# Patient Record
Sex: FEMALE | Race: BLACK OR AFRICAN AMERICAN | NOT HISPANIC OR LATINO | Employment: OTHER | URBAN - METROPOLITAN AREA
[De-identification: names, ages, dates, MRNs, and addresses within clinical notes are randomized per-mention and may not be internally consistent; named-entity substitution may affect disease eponyms.]

---

## 2024-02-12 ENCOUNTER — APPOINTMENT (OUTPATIENT)
Dept: OTOLARYNGOLOGY | Facility: CLINIC | Age: 69
End: 2024-02-12
Payer: MEDICARE

## 2024-05-03 ENCOUNTER — HOSPITAL ENCOUNTER (EMERGENCY)
Facility: HOSPITAL | Age: 69
Discharge: HOME | End: 2024-05-03
Payer: MEDICARE

## 2024-05-03 ENCOUNTER — PHARMACY VISIT (OUTPATIENT)
Dept: PHARMACY | Facility: CLINIC | Age: 69
End: 2024-05-03

## 2024-05-03 VITALS
TEMPERATURE: 98.2 F | OXYGEN SATURATION: 98 % | HEIGHT: 66 IN | BODY MASS INDEX: 33.11 KG/M2 | DIASTOLIC BLOOD PRESSURE: 76 MMHG | SYSTOLIC BLOOD PRESSURE: 135 MMHG | WEIGHT: 206 LBS | RESPIRATION RATE: 20 BRPM | HEART RATE: 81 BPM

## 2024-05-03 DIAGNOSIS — M10.9 ACUTE GOUT INVOLVING TOE OF LEFT FOOT, UNSPECIFIED CAUSE: Primary | ICD-10-CM

## 2024-05-03 PROCEDURE — 99283 EMERGENCY DEPT VISIT LOW MDM: CPT

## 2024-05-03 PROCEDURE — RXMED WILLOW AMBULATORY MEDICATION CHARGE

## 2024-05-03 RX ORDER — INDOMETHACIN 25 MG/1
50 CAPSULE ORAL
Qty: 42 CAPSULE | Refills: 0 | Status: SHIPPED | OUTPATIENT
Start: 2024-05-03

## 2024-05-03 ASSESSMENT — LIFESTYLE VARIABLES
EVER FELT BAD OR GUILTY ABOUT YOUR DRINKING: NO
HAVE YOU EVER FELT YOU SHOULD CUT DOWN ON YOUR DRINKING: NO
EVER HAD A DRINK FIRST THING IN THE MORNING TO STEADY YOUR NERVES TO GET RID OF A HANGOVER: NO
TOTAL SCORE: 0
HAVE PEOPLE ANNOYED YOU BY CRITICIZING YOUR DRINKING: NO

## 2024-05-03 ASSESSMENT — COLUMBIA-SUICIDE SEVERITY RATING SCALE - C-SSRS
2. HAVE YOU ACTUALLY HAD ANY THOUGHTS OF KILLING YOURSELF?: NO
6. HAVE YOU EVER DONE ANYTHING, STARTED TO DO ANYTHING, OR PREPARED TO DO ANYTHING TO END YOUR LIFE?: NO
1. IN THE PAST MONTH, HAVE YOU WISHED YOU WERE DEAD OR WISHED YOU COULD GO TO SLEEP AND NOT WAKE UP?: NO

## 2024-05-03 ASSESSMENT — PAIN DESCRIPTION - ORIENTATION: ORIENTATION: LEFT

## 2024-05-03 ASSESSMENT — PAIN DESCRIPTION - PAIN TYPE: TYPE: ACUTE PAIN

## 2024-05-03 ASSESSMENT — PAIN DESCRIPTION - DESCRIPTORS: DESCRIPTORS: ACHING

## 2024-05-03 ASSESSMENT — PAIN - FUNCTIONAL ASSESSMENT: PAIN_FUNCTIONAL_ASSESSMENT: 0-10

## 2024-05-03 ASSESSMENT — PAIN DESCRIPTION - LOCATION: LOCATION: TOE (COMMENT WHICH ONE)

## 2024-05-03 ASSESSMENT — PAIN SCALES - GENERAL: PAINLEVEL_OUTOF10: 10 - WORST POSSIBLE PAIN

## 2024-05-03 NOTE — ED PROVIDER NOTES
Chief Complaint   Patient presents with   • Toe Pain     C/O left great toe pain       HPI       69 year old female presents to the Emergency Department today complaining of awakening this am with left great toe pain. Denies any injury/trauma to the area. Denies any associated fever, chills, headache, neck pain, chest pain, shortness of breath, abdominal pain, nausea, vomiting, diarrhea, constipation, or urinary symptoms.       History provided by:  Patient             Patient History   No past medical history on file.  No past surgical history on file.  No family history on file.  Social History     Tobacco Use   • Smoking status: Not on file   • Smokeless tobacco: Not on file   Substance Use Topics   • Alcohol use: Not on file   • Drug use: Not on file           Physical Exam  Constitutional:       General: She is awake.      Appearance: Normal appearance.   Cardiovascular:      Rate and Rhythm: Normal rate and regular rhythm.      Pulses:           Radial pulses are 3+ on the right side and 3+ on the left side.      Heart sounds: Normal heart sounds. No murmur heard.     No friction rub. No gallop.   Pulmonary:      Effort: Pulmonary effort is normal.      Breath sounds: Normal breath sounds and air entry.   Musculoskeletal:      Comments: Redness, edema, and tenderness is noted to the left 1st MTP joint consistent with gout. Full ROM. Left dorsalis pedis pulse is strong and regular. Capillary refill was within normal limits. Sensation is intact distally.    Neurological:      Mental Status: She is alert.   Psychiatric:         Behavior: Behavior is cooperative.         Labs Reviewed - No data to display    No orders to display            ED Course & MDM   Diagnoses as of 05/03/24 1543   Acute gout involving toe of left foot, unspecified cause           Medical Decision Making  Patient was seen and evaluated by myself. Without any injury or trauma to the area, I do not feel that a x-ray is clinically indicated.  There is no pain with ROM and I do not feel that she is exhibiting signs of a septic joint. Rather, I feel that her symptoms are secondary to gout. Given a prescription for Indomethacin. No contraindications to NSAIDs are noted. Follow up with their doctor in 3 days. Return if worse in any way. Discharged in stable condition with computer instructions.    Diagnostic Impression:     1. Acute gout    2. Prescription therapy            Your medication list      You have not been prescribed any medications.           Procedure  Procedures     Narayan Noriega, KATY-CNP  05/03/24 1542

## 2025-01-21 ENCOUNTER — HOSPITAL ENCOUNTER (INPATIENT)
Facility: HOSPITAL | Age: 70
LOS: 1 days | Discharge: HOME | End: 2025-01-22
Attending: STUDENT IN AN ORGANIZED HEALTH CARE EDUCATION/TRAINING PROGRAM | Admitting: STUDENT IN AN ORGANIZED HEALTH CARE EDUCATION/TRAINING PROGRAM
Payer: MEDICARE

## 2025-01-21 ENCOUNTER — APPOINTMENT (OUTPATIENT)
Dept: CARDIOLOGY | Facility: HOSPITAL | Age: 70
End: 2025-01-21
Payer: MEDICARE

## 2025-01-21 ENCOUNTER — APPOINTMENT (OUTPATIENT)
Dept: RADIOLOGY | Facility: HOSPITAL | Age: 70
End: 2025-01-21
Payer: MEDICARE

## 2025-01-21 DIAGNOSIS — K52.9 GASTROENTERITIS: ICD-10-CM

## 2025-01-21 DIAGNOSIS — K56.609 SBO (SMALL BOWEL OBSTRUCTION) (MULTI): Primary | ICD-10-CM

## 2025-01-21 PROBLEM — H90.5 SENSORINEURAL HEARING LOSS (SNHL): Status: ACTIVE | Noted: 2025-01-21

## 2025-01-21 PROBLEM — K21.9 GASTROESOPHAGEAL REFLUX DISEASE: Status: ACTIVE | Noted: 2019-04-30

## 2025-01-21 PROBLEM — I10 ESSENTIAL (PRIMARY) HYPERTENSION: Status: ACTIVE | Noted: 2023-05-17

## 2025-01-21 PROBLEM — M10.9 GOUTY ARTHRITIS OF TOE: Status: ACTIVE | Noted: 2025-01-21

## 2025-01-21 PROBLEM — M54.12 CERVICAL RADICULOPATHY: Status: ACTIVE | Noted: 2021-02-17

## 2025-01-21 PROBLEM — J34.2 DEVIATED NASAL SEPTUM: Status: ACTIVE | Noted: 2025-01-21

## 2025-01-21 PROBLEM — E78.5 HYPERLIPIDEMIA: Status: ACTIVE | Noted: 2019-04-30

## 2025-01-21 PROBLEM — N25.81 SECONDARY HYPERPARATHYROIDISM (MULTI): Status: ACTIVE | Noted: 2019-06-14

## 2025-01-21 PROBLEM — J31.0 CHRONIC RHINITIS: Status: ACTIVE | Noted: 2025-01-21

## 2025-01-21 PROBLEM — E55.9 VITAMIN D DEFICIENCY: Status: ACTIVE | Noted: 2019-05-24

## 2025-01-21 PROBLEM — R42 DIZZINESS AND GIDDINESS: Status: ACTIVE | Noted: 2023-05-17

## 2025-01-21 PROBLEM — R10.9 ABDOMINAL PAIN: Status: ACTIVE | Noted: 2019-05-24

## 2025-01-21 PROBLEM — N28.9 RENAL INSUFFICIENCY: Status: ACTIVE | Noted: 2019-05-28

## 2025-01-21 PROBLEM — Z86.39 HISTORY OF THYROID DISORDER: Status: ACTIVE | Noted: 2025-01-21

## 2025-01-21 PROBLEM — Z86.39 HISTORY OF ELEVATED LIPIDS: Status: ACTIVE | Noted: 2025-01-21

## 2025-01-21 LAB
ALBUMIN SERPL BCP-MCNC: 4.5 G/DL (ref 3.4–5)
ALP SERPL-CCNC: 107 U/L (ref 33–136)
ALT SERPL W P-5'-P-CCNC: 21 U/L (ref 7–45)
ANION GAP SERPL CALC-SCNC: 12 MMOL/L (ref 10–20)
AST SERPL W P-5'-P-CCNC: 16 U/L (ref 9–39)
BASOPHILS # BLD AUTO: 0.03 X10*3/UL (ref 0–0.1)
BASOPHILS NFR BLD AUTO: 0.4 %
BILIRUB SERPL-MCNC: 0.5 MG/DL (ref 0–1.2)
BUN SERPL-MCNC: 21 MG/DL (ref 6–23)
CALCIUM SERPL-MCNC: 10.7 MG/DL (ref 8.6–10.3)
CARDIAC TROPONIN I PNL SERPL HS: 4 NG/L (ref 0–13)
CHLORIDE SERPL-SCNC: 102 MMOL/L (ref 98–107)
CO2 SERPL-SCNC: 26 MMOL/L (ref 21–32)
CREAT SERPL-MCNC: 1.54 MG/DL (ref 0.5–1.05)
EGFRCR SERPLBLD CKD-EPI 2021: 36 ML/MIN/1.73M*2
EOSINOPHIL # BLD AUTO: 0.04 X10*3/UL (ref 0–0.7)
EOSINOPHIL NFR BLD AUTO: 0.5 %
ERYTHROCYTE [DISTWIDTH] IN BLOOD BY AUTOMATED COUNT: 13.2 % (ref 11.5–14.5)
FLUAV RNA RESP QL NAA+PROBE: NOT DETECTED
FLUBV RNA RESP QL NAA+PROBE: NOT DETECTED
GLUCOSE SERPL-MCNC: 109 MG/DL (ref 74–99)
HCT VFR BLD AUTO: 47.2 % (ref 36–46)
HGB BLD-MCNC: 15.8 G/DL (ref 12–16)
IMM GRANULOCYTES # BLD AUTO: 0.01 X10*3/UL (ref 0–0.7)
IMM GRANULOCYTES NFR BLD AUTO: 0.1 % (ref 0–0.9)
LACTATE SERPL-SCNC: 0.9 MMOL/L (ref 0.4–2)
LIPASE SERPL-CCNC: 26 U/L (ref 9–82)
LYMPHOCYTES # BLD AUTO: 1.7 X10*3/UL (ref 1.2–4.8)
LYMPHOCYTES NFR BLD AUTO: 21 %
MCH RBC QN AUTO: 30.3 PG (ref 26–34)
MCHC RBC AUTO-ENTMCNC: 33.5 G/DL (ref 32–36)
MCV RBC AUTO: 91 FL (ref 80–100)
MONOCYTES # BLD AUTO: 0.59 X10*3/UL (ref 0.1–1)
MONOCYTES NFR BLD AUTO: 7.3 %
NEUTROPHILS # BLD AUTO: 5.71 X10*3/UL (ref 1.2–7.7)
NEUTROPHILS NFR BLD AUTO: 70.7 %
NRBC BLD-RTO: 0 /100 WBCS (ref 0–0)
PLATELET # BLD AUTO: 406 X10*3/UL (ref 150–450)
POTASSIUM SERPL-SCNC: 4.9 MMOL/L (ref 3.5–5.3)
PROT SERPL-MCNC: 8.4 G/DL (ref 6.4–8.2)
RBC # BLD AUTO: 5.21 X10*6/UL (ref 4–5.2)
SARS-COV-2 RNA RESP QL NAA+PROBE: NOT DETECTED
SODIUM SERPL-SCNC: 135 MMOL/L (ref 136–145)
WBC # BLD AUTO: 8.1 X10*3/UL (ref 4.4–11.3)

## 2025-01-21 PROCEDURE — 74018 RADEX ABDOMEN 1 VIEW: CPT

## 2025-01-21 PROCEDURE — 74018 RADEX ABDOMEN 1 VIEW: CPT | Performed by: RADIOLOGY

## 2025-01-21 PROCEDURE — 36415 COLL VENOUS BLD VENIPUNCTURE: CPT | Performed by: PHYSICIAN ASSISTANT

## 2025-01-21 PROCEDURE — 84484 ASSAY OF TROPONIN QUANT: CPT | Performed by: PHYSICIAN ASSISTANT

## 2025-01-21 PROCEDURE — 83690 ASSAY OF LIPASE: CPT | Performed by: PHYSICIAN ASSISTANT

## 2025-01-21 PROCEDURE — 96374 THER/PROPH/DIAG INJ IV PUSH: CPT

## 2025-01-21 PROCEDURE — 74176 CT ABD & PELVIS W/O CONTRAST: CPT | Mod: FOREIGN READ | Performed by: RADIOLOGY

## 2025-01-21 PROCEDURE — 2500000004 HC RX 250 GENERAL PHARMACY W/ HCPCS (ALT 636 FOR OP/ED): Performed by: PHYSICIAN ASSISTANT

## 2025-01-21 PROCEDURE — 1100000001 HC PRIVATE ROOM DAILY

## 2025-01-21 PROCEDURE — 99223 1ST HOSP IP/OBS HIGH 75: CPT | Performed by: STUDENT IN AN ORGANIZED HEALTH CARE EDUCATION/TRAINING PROGRAM

## 2025-01-21 PROCEDURE — 87636 SARSCOV2 & INF A&B AMP PRB: CPT | Performed by: PHYSICIAN ASSISTANT

## 2025-01-21 PROCEDURE — 96361 HYDRATE IV INFUSION ADD-ON: CPT

## 2025-01-21 PROCEDURE — 96375 TX/PRO/DX INJ NEW DRUG ADDON: CPT

## 2025-01-21 PROCEDURE — 99285 EMERGENCY DEPT VISIT HI MDM: CPT | Mod: 25 | Performed by: STUDENT IN AN ORGANIZED HEALTH CARE EDUCATION/TRAINING PROGRAM

## 2025-01-21 PROCEDURE — 84075 ASSAY ALKALINE PHOSPHATASE: CPT | Performed by: PHYSICIAN ASSISTANT

## 2025-01-21 PROCEDURE — 83605 ASSAY OF LACTIC ACID: CPT | Performed by: PHYSICIAN ASSISTANT

## 2025-01-21 PROCEDURE — 85025 COMPLETE CBC W/AUTO DIFF WBC: CPT | Performed by: PHYSICIAN ASSISTANT

## 2025-01-21 PROCEDURE — 0D9670Z DRAINAGE OF STOMACH WITH DRAINAGE DEVICE, VIA NATURAL OR ARTIFICIAL OPENING: ICD-10-PCS | Performed by: INTERNAL MEDICINE

## 2025-01-21 PROCEDURE — 74176 CT ABD & PELVIS W/O CONTRAST: CPT

## 2025-01-21 PROCEDURE — 2500000004 HC RX 250 GENERAL PHARMACY W/ HCPCS (ALT 636 FOR OP/ED): Performed by: STUDENT IN AN ORGANIZED HEALTH CARE EDUCATION/TRAINING PROGRAM

## 2025-01-21 PROCEDURE — 74018 RADEX ABDOMEN 1 VIEW: CPT | Mod: FOREIGN READ | Performed by: RADIOLOGY

## 2025-01-21 PROCEDURE — 93005 ELECTROCARDIOGRAM TRACING: CPT

## 2025-01-21 RX ORDER — PANTOPRAZOLE SODIUM 40 MG/1
40 TABLET, DELAYED RELEASE ORAL
Status: DISCONTINUED | OUTPATIENT
Start: 2025-01-22 | End: 2025-01-22 | Stop reason: HOSPADM

## 2025-01-21 RX ORDER — PANTOPRAZOLE SODIUM 40 MG/10ML
40 INJECTION, POWDER, LYOPHILIZED, FOR SOLUTION INTRAVENOUS
Status: DISCONTINUED | OUTPATIENT
Start: 2025-01-22 | End: 2025-01-22 | Stop reason: HOSPADM

## 2025-01-21 RX ORDER — SODIUM CHLORIDE, SODIUM LACTATE, POTASSIUM CHLORIDE, CALCIUM CHLORIDE 600; 310; 30; 20 MG/100ML; MG/100ML; MG/100ML; MG/100ML
75 INJECTION, SOLUTION INTRAVENOUS CONTINUOUS
Status: ACTIVE | OUTPATIENT
Start: 2025-01-21 | End: 2025-01-22

## 2025-01-21 RX ORDER — LORATADINE 10 MG/1
10 TABLET ORAL DAILY
COMMUNITY
Start: 2024-02-29

## 2025-01-21 RX ORDER — ONDANSETRON HYDROCHLORIDE 2 MG/ML
4 INJECTION, SOLUTION INTRAVENOUS EVERY 8 HOURS PRN
Status: DISCONTINUED | OUTPATIENT
Start: 2025-01-21 | End: 2025-01-22 | Stop reason: HOSPADM

## 2025-01-21 RX ORDER — BISACODYL 10 MG/1
10 SUPPOSITORY RECTAL DAILY PRN
Status: DISCONTINUED | OUTPATIENT
Start: 2025-01-21 | End: 2025-01-22 | Stop reason: HOSPADM

## 2025-01-21 RX ORDER — MORPHINE SULFATE 4 MG/ML
4 INJECTION INTRAVENOUS ONCE
Status: COMPLETED | OUTPATIENT
Start: 2025-01-21 | End: 2025-01-21

## 2025-01-21 RX ORDER — ONDANSETRON HYDROCHLORIDE 2 MG/ML
4 INJECTION, SOLUTION INTRAVENOUS ONCE
Status: COMPLETED | OUTPATIENT
Start: 2025-01-21 | End: 2025-01-21

## 2025-01-21 RX ORDER — MORPHINE SULFATE 4 MG/ML
4 INJECTION INTRAVENOUS EVERY 4 HOURS PRN
Status: DISCONTINUED | OUTPATIENT
Start: 2025-01-21 | End: 2025-01-22 | Stop reason: HOSPADM

## 2025-01-21 RX ORDER — BISACODYL 5 MG
10 TABLET, DELAYED RELEASE (ENTERIC COATED) ORAL DAILY PRN
Status: DISCONTINUED | OUTPATIENT
Start: 2025-01-21 | End: 2025-01-22 | Stop reason: HOSPADM

## 2025-01-21 RX ORDER — LEVOTHYROXINE SODIUM 112 UG/1
112 TABLET ORAL DAILY
Status: DISCONTINUED | OUTPATIENT
Start: 2025-01-22 | End: 2025-01-22 | Stop reason: HOSPADM

## 2025-01-21 RX ORDER — MORPHINE SULFATE 2 MG/ML
2 INJECTION, SOLUTION INTRAMUSCULAR; INTRAVENOUS EVERY 4 HOURS PRN
Status: DISCONTINUED | OUTPATIENT
Start: 2025-01-21 | End: 2025-01-22 | Stop reason: HOSPADM

## 2025-01-21 RX ORDER — LEVOTHYROXINE SODIUM 112 UG/1
1 TABLET ORAL DAILY
COMMUNITY

## 2025-01-21 RX ORDER — ENOXAPARIN SODIUM 100 MG/ML
40 INJECTION SUBCUTANEOUS EVERY 24 HOURS
Status: DISCONTINUED | OUTPATIENT
Start: 2025-01-21 | End: 2025-01-22 | Stop reason: HOSPADM

## 2025-01-21 RX ORDER — ONDANSETRON 4 MG/1
4 TABLET, FILM COATED ORAL EVERY 8 HOURS PRN
Status: DISCONTINUED | OUTPATIENT
Start: 2025-01-21 | End: 2025-01-22 | Stop reason: HOSPADM

## 2025-01-21 RX ADMIN — ENOXAPARIN SODIUM 40 MG: 40 INJECTION SUBCUTANEOUS at 21:57

## 2025-01-21 RX ADMIN — MORPHINE SULFATE 4 MG: 4 INJECTION, SOLUTION INTRAMUSCULAR; INTRAVENOUS at 19:10

## 2025-01-21 RX ADMIN — PROMETHAZINE HYDROCHLORIDE 12.5 MG: 25 INJECTION INTRAMUSCULAR; INTRAVENOUS at 19:23

## 2025-01-21 RX ADMIN — SODIUM CHLORIDE, POTASSIUM CHLORIDE, SODIUM LACTATE AND CALCIUM CHLORIDE 75 ML/HR: 600; 310; 30; 20 INJECTION, SOLUTION INTRAVENOUS at 21:57

## 2025-01-21 RX ADMIN — SODIUM CHLORIDE 1000 ML: 9 INJECTION, SOLUTION INTRAVENOUS at 18:51

## 2025-01-21 RX ADMIN — ONDANSETRON 4 MG: 2 INJECTION INTRAMUSCULAR; INTRAVENOUS at 16:30

## 2025-01-21 SDOH — ECONOMIC STABILITY: FOOD INSECURITY: HOW HARD IS IT FOR YOU TO PAY FOR THE VERY BASICS LIKE FOOD, HOUSING, MEDICAL CARE, AND HEATING?: NOT HARD AT ALL

## 2025-01-21 SDOH — ECONOMIC STABILITY: FOOD INSECURITY: WITHIN THE PAST 12 MONTHS, YOU WORRIED THAT YOUR FOOD WOULD RUN OUT BEFORE YOU GOT THE MONEY TO BUY MORE.: NEVER TRUE

## 2025-01-21 SDOH — SOCIAL STABILITY: SOCIAL INSECURITY: WITHIN THE LAST YEAR, HAVE YOU BEEN HUMILIATED OR EMOTIONALLY ABUSED IN OTHER WAYS BY YOUR PARTNER OR EX-PARTNER?: NO

## 2025-01-21 SDOH — ECONOMIC STABILITY: FOOD INSECURITY: WITHIN THE PAST 12 MONTHS, THE FOOD YOU BOUGHT JUST DIDN'T LAST AND YOU DIDN'T HAVE MONEY TO GET MORE.: NEVER TRUE

## 2025-01-21 SDOH — SOCIAL STABILITY: SOCIAL INSECURITY
WITHIN THE LAST YEAR, HAVE YOU BEEN KICKED, HIT, SLAPPED, OR OTHERWISE PHYSICALLY HURT BY YOUR PARTNER OR EX-PARTNER?: NO

## 2025-01-21 SDOH — ECONOMIC STABILITY: HOUSING INSECURITY: IN THE LAST 12 MONTHS, WAS THERE A TIME WHEN YOU WERE NOT ABLE TO PAY THE MORTGAGE OR RENT ON TIME?: NO

## 2025-01-21 SDOH — SOCIAL STABILITY: SOCIAL INSECURITY: ABUSE: ADULT

## 2025-01-21 SDOH — SOCIAL STABILITY: SOCIAL INSECURITY
WITHIN THE LAST YEAR, HAVE YOU BEEN RAPED OR FORCED TO HAVE ANY KIND OF SEXUAL ACTIVITY BY YOUR PARTNER OR EX-PARTNER?: NO

## 2025-01-21 SDOH — SOCIAL STABILITY: SOCIAL INSECURITY: DO YOU FEEL ANYONE HAS EXPLOITED OR TAKEN ADVANTAGE OF YOU FINANCIALLY OR OF YOUR PERSONAL PROPERTY?: NO

## 2025-01-21 SDOH — SOCIAL STABILITY: SOCIAL INSECURITY: WERE YOU ABLE TO COMPLETE ALL THE BEHAVIORAL HEALTH SCREENINGS?: YES

## 2025-01-21 SDOH — ECONOMIC STABILITY: HOUSING INSECURITY: AT ANY TIME IN THE PAST 12 MONTHS, WERE YOU HOMELESS OR LIVING IN A SHELTER (INCLUDING NOW)?: NO

## 2025-01-21 SDOH — ECONOMIC STABILITY: INCOME INSECURITY: IN THE PAST 12 MONTHS HAS THE ELECTRIC, GAS, OIL, OR WATER COMPANY THREATENED TO SHUT OFF SERVICES IN YOUR HOME?: NO

## 2025-01-21 SDOH — SOCIAL STABILITY: SOCIAL INSECURITY: HAVE YOU HAD ANY THOUGHTS OF HARMING ANYONE ELSE?: NO

## 2025-01-21 SDOH — SOCIAL STABILITY: SOCIAL INSECURITY: DOES ANYONE TRY TO KEEP YOU FROM HAVING/CONTACTING OTHER FRIENDS OR DOING THINGS OUTSIDE YOUR HOME?: NO

## 2025-01-21 SDOH — SOCIAL STABILITY: SOCIAL INSECURITY: WITHIN THE LAST YEAR, HAVE YOU BEEN AFRAID OF YOUR PARTNER OR EX-PARTNER?: NO

## 2025-01-21 SDOH — SOCIAL STABILITY: SOCIAL INSECURITY: ARE THERE ANY APPARENT SIGNS OF INJURIES/BEHAVIORS THAT COULD BE RELATED TO ABUSE/NEGLECT?: NO

## 2025-01-21 SDOH — SOCIAL STABILITY: SOCIAL INSECURITY: HAVE YOU HAD THOUGHTS OF HARMING ANYONE ELSE?: NO

## 2025-01-21 SDOH — ECONOMIC STABILITY: HOUSING INSECURITY: IN THE PAST 12 MONTHS, HOW MANY TIMES HAVE YOU MOVED WHERE YOU WERE LIVING?: 0

## 2025-01-21 SDOH — ECONOMIC STABILITY: TRANSPORTATION INSECURITY: IN THE PAST 12 MONTHS, HAS LACK OF TRANSPORTATION KEPT YOU FROM MEDICAL APPOINTMENTS OR FROM GETTING MEDICATIONS?: NO

## 2025-01-21 SDOH — SOCIAL STABILITY: SOCIAL INSECURITY: DO YOU FEEL UNSAFE GOING BACK TO THE PLACE WHERE YOU ARE LIVING?: NO

## 2025-01-21 SDOH — SOCIAL STABILITY: SOCIAL INSECURITY: ARE YOU OR HAVE YOU BEEN THREATENED OR ABUSED PHYSICALLY, EMOTIONALLY, OR SEXUALLY BY ANYONE?: NO

## 2025-01-21 SDOH — SOCIAL STABILITY: SOCIAL INSECURITY: HAS ANYONE EVER THREATENED TO HURT YOUR FAMILY OR YOUR PETS?: NO

## 2025-01-21 ASSESSMENT — PAIN SCALES - GENERAL
PAINLEVEL_OUTOF10: 7
PAINLEVEL_OUTOF10: 0 - NO PAIN

## 2025-01-21 ASSESSMENT — COGNITIVE AND FUNCTIONAL STATUS - GENERAL
MOBILITY SCORE: 24
DAILY ACTIVITIY SCORE: 24

## 2025-01-21 ASSESSMENT — PAIN DESCRIPTION - LOCATION: LOCATION: ABDOMEN

## 2025-01-21 ASSESSMENT — ENCOUNTER SYMPTOMS
CHILLS: 0
SHORTNESS OF BREATH: 0
HEMATURIA: 0
POLYDIPSIA: 0
DIARRHEA: 1
APPETITE CHANGE: 1
NAUSEA: 1
ABDOMINAL PAIN: 1
VOMITING: 1
PALPITATIONS: 0
BLOOD IN STOOL: 0
DYSURIA: 0
FEVER: 0
TREMORS: 0
COUGH: 0
WEAKNESS: 0
DIZZINESS: 0
CONFUSION: 0
SLEEP DISTURBANCE: 0
COLOR CHANGE: 0
LIGHT-HEADEDNESS: 0
PHOTOPHOBIA: 0

## 2025-01-21 ASSESSMENT — COLUMBIA-SUICIDE SEVERITY RATING SCALE - C-SSRS
1. IN THE PAST MONTH, HAVE YOU WISHED YOU WERE DEAD OR WISHED YOU COULD GO TO SLEEP AND NOT WAKE UP?: NO
2. HAVE YOU ACTUALLY HAD ANY THOUGHTS OF KILLING YOURSELF?: NO
6. HAVE YOU EVER DONE ANYTHING, STARTED TO DO ANYTHING, OR PREPARED TO DO ANYTHING TO END YOUR LIFE?: NO

## 2025-01-21 ASSESSMENT — LIFESTYLE VARIABLES
AUDIT-C TOTAL SCORE: 0
HOW OFTEN DO YOU HAVE 6 OR MORE DRINKS ON ONE OCCASION: NEVER
HOW OFTEN DO YOU HAVE A DRINK CONTAINING ALCOHOL: NEVER
AUDIT-C TOTAL SCORE: 0
EVER FELT BAD OR GUILTY ABOUT YOUR DRINKING: NO
HAVE YOU EVER FELT YOU SHOULD CUT DOWN ON YOUR DRINKING: NO
HOW MANY STANDARD DRINKS CONTAINING ALCOHOL DO YOU HAVE ON A TYPICAL DAY: PATIENT DOES NOT DRINK
HAVE PEOPLE ANNOYED YOU BY CRITICIZING YOUR DRINKING: NO
EVER HAD A DRINK FIRST THING IN THE MORNING TO STEADY YOUR NERVES TO GET RID OF A HANGOVER: NO
TOTAL SCORE: 0
SKIP TO QUESTIONS 9-10: 1

## 2025-01-21 ASSESSMENT — ACTIVITIES OF DAILY LIVING (ADL)
FEEDING YOURSELF: INDEPENDENT
HEARING - LEFT EAR: FUNCTIONAL
LACK_OF_TRANSPORTATION: NO
JUDGMENT_ADEQUATE_SAFELY_COMPLETE_DAILY_ACTIVITIES: YES
ADEQUATE_TO_COMPLETE_ADL: YES
WALKS IN HOME: INDEPENDENT
BATHING: INDEPENDENT
TOILETING: INDEPENDENT
LACK_OF_TRANSPORTATION: NO
LACK_OF_TRANSPORTATION: NO
ASSISTIVE_DEVICE: DENTURES UPPER
HEARING - RIGHT EAR: FUNCTIONAL
PATIENT'S MEMORY ADEQUATE TO SAFELY COMPLETE DAILY ACTIVITIES?: YES
GROOMING: INDEPENDENT
DRESSING YOURSELF: INDEPENDENT

## 2025-01-21 ASSESSMENT — PATIENT HEALTH QUESTIONNAIRE - PHQ9
1. LITTLE INTEREST OR PLEASURE IN DOING THINGS: NOT AT ALL
2. FEELING DOWN, DEPRESSED OR HOPELESS: NOT AT ALL
SUM OF ALL RESPONSES TO PHQ9 QUESTIONS 1 & 2: 0

## 2025-01-21 ASSESSMENT — PAIN DESCRIPTION - PAIN TYPE: TYPE: ACUTE PAIN

## 2025-01-21 ASSESSMENT — PAIN - FUNCTIONAL ASSESSMENT
PAIN_FUNCTIONAL_ASSESSMENT: 0-10
PAIN_FUNCTIONAL_ASSESSMENT: 0-10

## 2025-01-21 NOTE — ED TRIAGE NOTES
Pt presents for nausea and vomiting since 1700 yesterday evening. She believes that she ate undercooked chicken and started to get sick approx 15 minutes after eating. She has abdominal pain associated with the nausea and vomiting that varies in intensity. She also has had non-loose stool that has been back to back.

## 2025-01-21 NOTE — ED PROVIDER NOTES
EMERGENCY MEDICINE EVALUATION NOTE    History of Present Illness     Chief Complaint:   Chief Complaint   Patient presents with    Vomiting    Nausea       HPI: Naheed Hanley is a 69 y.o. female presents with a chief complaint of nausea and vomiting.  Patient was going on since yesterday.  She states that she was out to eat and had some chicken if she believes she was served undercooked chicken.  Patient present yesterday evening she has had diffuse abdominal pain with nausea and vomiting.  Patient did not want further diarrhea.  Patient denies any urinary symptoms such as dysuria or frequency.  Patient was he has allergy to penicillins.  Patient reports he did not try any at home for symptoms.  Patient denies any associated chest pain or shortness of breath.    Previous History   No past medical history on file.  No past surgical history on file.     No family history on file.  Allergies   Allergen Reactions    Lisinopril Unknown    Penicillin Unknown    Penicillins Rash     Current Outpatient Medications   Medication Instructions    indomethacin (INDOCIN) 50 mg, oral, 3 times daily (morning, midday, late afternoon)       Physical Exam     Appearance: Alert, oriented , cooperative     Skin: Intact,  dry skin, no lesions, rash, petechiae or purpura.      Eyes: PERRLA, EOMs intact,  Conjunctiva pink      ENT: Hearing grossly intact. Pharynx clear     Neck: Supple. Trachea at midline.      Pulmonary: Clear bilaterally. No rales, rhonchi or wheezing. No accessory muscle use or stridor.     Cardiac: Normal rate and rhythm without murmur     Abdomen: Soft, active bowel sounds.  Diffuse abdominal tenderness with no guarding or rebound.     Musculoskeletal: Full range of motion.     Neurological:Cranial nerves II through XII are grossly intact, normal sensation, no weakness, no focal findings identified.     Results     Labs Reviewed   CBC WITH AUTO DIFFERENTIAL - Abnormal       Result Value    WBC 8.1      nRBC 0.0       RBC 5.21 (*)     Hemoglobin 15.8      Hematocrit 47.2 (*)     MCV 91      MCH 30.3      MCHC 33.5      RDW 13.2      Platelets 406      Neutrophils % 70.7      Immature Granulocytes %, Automated 0.1      Lymphocytes % 21.0      Monocytes % 7.3      Eosinophils % 0.5      Basophils % 0.4      Neutrophils Absolute 5.71      Immature Granulocytes Absolute, Automated 0.01      Lymphocytes Absolute 1.70      Monocytes Absolute 0.59      Eosinophils Absolute 0.04      Basophils Absolute 0.03     COMPREHENSIVE METABOLIC PANEL - Abnormal    Glucose 109 (*)     Sodium 135 (*)     Potassium 4.9      Chloride 102      Bicarbonate 26      Anion Gap 12      Urea Nitrogen 21      Creatinine 1.54 (*)     eGFR 36 (*)     Calcium 10.7 (*)     Albumin 4.5      Alkaline Phosphatase 107      Total Protein 8.4 (*)     AST 16      Bilirubin, Total 0.5      ALT 21     LIPASE - Normal    Lipase 26      Narrative:     Venipuncture immediately after or during the administration of Metamizole may lead to falsely low results. Testing should be performed immediately prior to Metamizole dosing.   LACTATE - Normal    Lactate 0.9      Narrative:     Venipuncture immediately after or during the administration of Metamizole may lead to falsely low results. Testing should be performed immediately prior to Metamizole dosing.   TROPONIN I, HIGH SENSITIVITY - Normal    Troponin I, High Sensitivity 4      Narrative:     Less than 99th percentile of normal range cutoff-  Female and children under 18 years old <14 ng/L; Male <21 ng/L: Negative  Repeat testing should be performed if clinically indicated.     Female and children under 18 years old 14-50 ng/L; Male 21-50 ng/L:  Consistent with possible cardiac damage and possible increased clinical   risk. Serial measurements may help to assess extent of myocardial damage.     >50 ng/L: Consistent with cardiac damage, increased clinical risk and  myocardial infarction. Serial measurements may help assess  extent of   myocardial damage.      NOTE: Children less than 1 year old may have higher baseline troponin   levels and results should be interpreted in conjunction with the overall   clinical context.     NOTE: Troponin I testing is performed using a different   testing methodology at [facility] than at [facility]. Direct result comparisons should only   be made within the same method.   SARS-COV-2 PCR - Normal    Coronavirus 2019, PCR Not Detected      Narrative:     This assay is an FDA-cleared, in vitro diagnostic nucleic acid amplification test for the qualitative detection and differentiation of SARS CoV-2 from nasopharyngeal specimens collected from individuals with signs and symptoms of respiratory tract infections, and has been validated for use at [facility]. Negative results do not preclude COVID-19 infections and should not be used as the sole basis for diagnosis, treatment, or other management decisions. Testing for SARS CoV-2 is recommended only for patients who meet current clinical and/or epidemiological criteria defined by federal, state, or local public health directives.   INFLUENZA A AND B PCR - Normal    Flu A Result Not Detected      Flu B Result Not Detected      Narrative:     This assay is an in vitro diagnostic multiplex nucleic acid amplification test for the detection and discrimination of Influenza A & B from nasopharyngeal specimens, and has been validated for use at [facility]. Negative results do not preclude Influenza A/B infections, and should not be used as the sole basis for diagnosis, treatment, or other management decisions. If Influenza A/B and RSV PCR results are negative, testing for Parainfluenza virus, Adenovirus and Metapneumovirus is routinely performed for [facility] pediatric oncology and intensive care inpatients, and is available on other patients by placing an add-on request.   PTH, INTACT     CT  "abdomen pelvis wo IV contrast   Final Result   Small bowel obstruction in the right lower quadrant. Closed loop   obstruction is not excluded.  Surgery consultation recommended.  There   is mild mesenteric edema and minimal free fluid without a drainable   collection.  No free air or pneumatosis.  Limited assessment for bowel   viability/ischemic change without contrast.   Dr. Benitez discussed the critical findings which were acknowledged   by phone with Mayo Eubanks at 1930 hours on 1/21/2025.   The appendix is normal.   Signed by Ceferino Benitez DO            ED Course & Medical Decision Making     Medications   ondansetron (Zofran) injection 4 mg (4 mg intravenous Given 1/21/25 1630)   sodium chloride 0.9 % bolus 1,000 mL (1,000 mL intravenous New Bag 1/21/25 1851)   morphine injection 4 mg (4 mg intravenous Given 1/21/25 1910)   promethazine (Phenergan) 12.5 mg in sodium chloride 0.9% 50 mL IV (12.5 mg intravenous Given 1/21/25 1923)     Heart Rate:  [76]   Temperature:  [36.8 °C (98.2 °F)]   Respirations:  [16]   BP: (147)/(67)   Height:  [167.6 cm (5' 6\")]   Weight:  [90.7 kg (200 lb)]   Pulse Ox:  [96 %]    ED Course as of 01/21/25 2009 Tue Jan 21, 2025 1852 Patient reassessed at this time.  She still splinting some nausea and abdominal pain.  Patient will be able to get a ride home.  Patient currently receiving IV fluid bolus due to her elevated creatinine.  Patient will have requested nausea med ordered as well as pain medication. [CJ]   1852 EKG performed 1/21/2025 at 1615.  Sinus rhythm with ventricular of 60 bpm, parable 160 ms, QT/QTc of 401/401 ms.  No STEMI.  Interpreted by attending physician. [CJ]   1930 Updated by radiology patient does appear to have small bowel obstruction with transition point in right lower quadrant. [CJ]   1936 Updated patient on the plan of care.  I did inform her that she does have SBO present.  Patient complained of some nausea on reevaluation earlier however she " has not had any vomiting while here. [CJ]   1943 Spoke to the on-call surgeon Dr. Quinteros who states that he would like to hold off on the NG tube at this time.  He does request to be admitted to the hospital service with consult to surgery. [CJ]   1956 Spoke to Dr. Quinteros again he did reevaluate patient CT imaging and does request she have NG tube placed. [CJ]   2007 Spoke to the hospitalist TRE Norman who agreed to admit the patient for observation under Dr. Genao. [CJ]      ED Course User Index  [CJ] Mayo Mello PA-C         Diagnoses as of 01/21/25 2009   SBO (small bowel obstruction) (Multi)       Procedures   Procedures    Diagnosis     1. SBO (small bowel obstruction) (Multi)        Disposition   Admit for observation    ED Prescriptions    None         Disclaimer: This note was dictated by speech recognition. Minor errors in transcription may be present. Please call if questions.       Mayo Mello PA-C  01/21/25 2009

## 2025-01-22 ENCOUNTER — PHARMACY VISIT (OUTPATIENT)
Dept: PHARMACY | Facility: CLINIC | Age: 70
End: 2025-01-22
Payer: COMMERCIAL

## 2025-01-22 VITALS
TEMPERATURE: 98.6 F | DIASTOLIC BLOOD PRESSURE: 62 MMHG | BODY MASS INDEX: 32.14 KG/M2 | WEIGHT: 200 LBS | OXYGEN SATURATION: 93 % | RESPIRATION RATE: 16 BRPM | HEIGHT: 66 IN | HEART RATE: 59 BPM | SYSTOLIC BLOOD PRESSURE: 109 MMHG

## 2025-01-22 LAB
ALBUMIN SERPL BCP-MCNC: 4 G/DL (ref 3.4–5)
ALP SERPL-CCNC: 81 U/L (ref 33–136)
ALT SERPL W P-5'-P-CCNC: 17 U/L (ref 7–45)
ANION GAP SERPL CALC-SCNC: 10 MMOL/L (ref 10–20)
AST SERPL W P-5'-P-CCNC: 14 U/L (ref 9–39)
ATRIAL RATE: 60 BPM
BASOPHILS # BLD AUTO: 0.04 X10*3/UL (ref 0–0.1)
BASOPHILS NFR BLD AUTO: 0.5 %
BILIRUB SERPL-MCNC: 0.5 MG/DL (ref 0–1.2)
BUN SERPL-MCNC: 21 MG/DL (ref 6–23)
CALCIUM SERPL-MCNC: 9.7 MG/DL (ref 8.6–10.3)
CHLORIDE SERPL-SCNC: 105 MMOL/L (ref 98–107)
CO2 SERPL-SCNC: 24 MMOL/L (ref 21–32)
CREAT SERPL-MCNC: 1.74 MG/DL (ref 0.5–1.05)
EGFRCR SERPLBLD CKD-EPI 2021: 31 ML/MIN/1.73M*2
EOSINOPHIL # BLD AUTO: 0.1 X10*3/UL (ref 0–0.7)
EOSINOPHIL NFR BLD AUTO: 1.2 %
ERYTHROCYTE [DISTWIDTH] IN BLOOD BY AUTOMATED COUNT: 13.6 % (ref 11.5–14.5)
GLUCOSE SERPL-MCNC: 85 MG/DL (ref 74–99)
HCT VFR BLD AUTO: 40.7 % (ref 36–46)
HGB BLD-MCNC: 13.3 G/DL (ref 12–16)
IMM GRANULOCYTES # BLD AUTO: 0.02 X10*3/UL (ref 0–0.7)
IMM GRANULOCYTES NFR BLD AUTO: 0.2 % (ref 0–0.9)
LYMPHOCYTES # BLD AUTO: 3.85 X10*3/UL (ref 1.2–4.8)
LYMPHOCYTES NFR BLD AUTO: 46.6 %
MAGNESIUM SERPL-MCNC: 2.06 MG/DL (ref 1.6–2.4)
MCH RBC QN AUTO: 29.8 PG (ref 26–34)
MCHC RBC AUTO-ENTMCNC: 32.7 G/DL (ref 32–36)
MCV RBC AUTO: 91 FL (ref 80–100)
MONOCYTES # BLD AUTO: 0.75 X10*3/UL (ref 0.1–1)
MONOCYTES NFR BLD AUTO: 9.1 %
NEUTROPHILS # BLD AUTO: 3.51 X10*3/UL (ref 1.2–7.7)
NEUTROPHILS NFR BLD AUTO: 42.4 %
NRBC BLD-RTO: 0 /100 WBCS (ref 0–0)
P AXIS: 32 DEGREES
PLATELET # BLD AUTO: 355 X10*3/UL (ref 150–450)
POTASSIUM SERPL-SCNC: 4 MMOL/L (ref 3.5–5.3)
PR INTERVAL: 160 MS
PROT SERPL-MCNC: 6.7 G/DL (ref 6.4–8.2)
PTH-INTACT SERPL-MCNC: 244.4 PG/ML (ref 18.5–88)
Q ONSET: 253 MS
QRS COUNT: 10 BEATS
QRS DURATION: 90 MS
QT INTERVAL: 401 MS
QTC CALCULATION(BAZETT): 401 MS
QTC FREDERICIA: 401 MS
R AXIS: 2 DEGREES
RBC # BLD AUTO: 4.46 X10*6/UL (ref 4–5.2)
SODIUM SERPL-SCNC: 135 MMOL/L (ref 136–145)
T AXIS: 68 DEGREES
T OFFSET: 453 MS
VENTRICULAR RATE: 60 BPM
WBC # BLD AUTO: 8.3 X10*3/UL (ref 4.4–11.3)

## 2025-01-22 PROCEDURE — RXMED WILLOW AMBULATORY MEDICATION CHARGE

## 2025-01-22 PROCEDURE — 83970 ASSAY OF PARATHORMONE: CPT | Mod: PORLAB | Performed by: SURGERY

## 2025-01-22 PROCEDURE — 99239 HOSP IP/OBS DSCHRG MGMT >30: CPT | Performed by: INTERNAL MEDICINE

## 2025-01-22 PROCEDURE — 36415 COLL VENOUS BLD VENIPUNCTURE: CPT | Performed by: STUDENT IN AN ORGANIZED HEALTH CARE EDUCATION/TRAINING PROGRAM

## 2025-01-22 PROCEDURE — 2500000001 HC RX 250 WO HCPCS SELF ADMINISTERED DRUGS (ALT 637 FOR MEDICARE OP): Performed by: STUDENT IN AN ORGANIZED HEALTH CARE EDUCATION/TRAINING PROGRAM

## 2025-01-22 PROCEDURE — 80053 COMPREHEN METABOLIC PANEL: CPT | Performed by: STUDENT IN AN ORGANIZED HEALTH CARE EDUCATION/TRAINING PROGRAM

## 2025-01-22 PROCEDURE — 83735 ASSAY OF MAGNESIUM: CPT | Performed by: STUDENT IN AN ORGANIZED HEALTH CARE EDUCATION/TRAINING PROGRAM

## 2025-01-22 PROCEDURE — 85025 COMPLETE CBC W/AUTO DIFF WBC: CPT | Performed by: STUDENT IN AN ORGANIZED HEALTH CARE EDUCATION/TRAINING PROGRAM

## 2025-01-22 PROCEDURE — 2500000004 HC RX 250 GENERAL PHARMACY W/ HCPCS (ALT 636 FOR OP/ED): Performed by: INTERNAL MEDICINE

## 2025-01-22 RX ORDER — ONDANSETRON 4 MG/1
4 TABLET, FILM COATED ORAL EVERY 8 HOURS PRN
Qty: 20 TABLET | Refills: 0 | Status: SHIPPED | OUTPATIENT
Start: 2025-01-22

## 2025-01-22 RX ORDER — SODIUM CHLORIDE, SODIUM LACTATE, POTASSIUM CHLORIDE, CALCIUM CHLORIDE 600; 310; 30; 20 MG/100ML; MG/100ML; MG/100ML; MG/100ML
100 INJECTION, SOLUTION INTRAVENOUS CONTINUOUS
Status: DISCONTINUED | OUTPATIENT
Start: 2025-01-22 | End: 2025-01-22 | Stop reason: HOSPADM

## 2025-01-22 RX ADMIN — PANTOPRAZOLE SODIUM 40 MG: 40 TABLET, DELAYED RELEASE ORAL at 06:17

## 2025-01-22 RX ADMIN — SODIUM CHLORIDE, POTASSIUM CHLORIDE, SODIUM LACTATE AND CALCIUM CHLORIDE 100 ML/HR: 600; 310; 30; 20 INJECTION, SOLUTION INTRAVENOUS at 10:22

## 2025-01-22 ASSESSMENT — COGNITIVE AND FUNCTIONAL STATUS - GENERAL
DAILY ACTIVITIY SCORE: 24
PATIENT BASELINE BEDBOUND: NO
MOBILITY SCORE: 24
MOBILITY SCORE: 24
DAILY ACTIVITIY SCORE: 24

## 2025-01-22 ASSESSMENT — PAIN - FUNCTIONAL ASSESSMENT
PAIN_FUNCTIONAL_ASSESSMENT: 0-10
PAIN_FUNCTIONAL_ASSESSMENT: 0-10

## 2025-01-22 ASSESSMENT — ACTIVITIES OF DAILY LIVING (ADL): LACK_OF_TRANSPORTATION: NO

## 2025-01-22 ASSESSMENT — PAIN SCALES - GENERAL
PAINLEVEL_OUTOF10: 0 - NO PAIN
PAINLEVEL_OUTOF10: 0 - NO PAIN

## 2025-01-22 NOTE — PROGRESS NOTES
Physical Therapy                 Therapy Communication Note    Patient Name: Naheed Hanley  MRN: [id number]  Department: [facility] 3 E  Room: [facility]3316-A  Today's Date: 1/22/2025     Discipline: Physical Therapy  Time: 1772-9902        Comment: Chart reviewed. RN approved PT assessment. Patient interviewed, she has been taking herself to/from the bathroom without issue. She was able to get out of bed, ambulate within the room without a device independently. She is home alone with 2 DWIGHT, but verbalizes no concerns about her mobility or returning home. Nurse also confirms patient's independence. No PT needs identified. Plan to discontinue PT order.

## 2025-01-22 NOTE — NURSING NOTE
Discharge instructions reviewed with patient, she verbalized understanding. Iv removed. Meds to beds being delivered. No questions at this time.

## 2025-01-22 NOTE — CONSULTS
"Reason For Consult  Bowel Obstruction    History Of Present Illness  Naheed Hanley is a 69 y.o. female with PMHx s/f HTN, HLD, CKD III, secondary hyperparathyroidism, hypothyroidism, gout, GERD, chronic rhinitis, obesity, presenting with 1d h/o nausea, vomiting, abdominal pain. Pt reports that she was in her typical state of health until shortly after she ate some chicken ~1700 on 01/20/2025 which she believes may have been undercooked. About 15 minutes after eating, she developed generalized abdominal discomfort, nausea, and had NBNB emesis. She had a few episodes of loose stool (no melena, hematochezia). She continued to feel unwell into the morning today. She has not had BM since the initial loose stool yesterday. She tried Pepto bismol and clear soda to alleviate her symptoms, but reports it was \"like a science experiment\" because it immediately exploded back out of her. She reports generalized abdominal discomfort, worst now in the RLQ, but significantly improved since arrival to the ED. Her only prior abdominal surgery is a hysterectomy. She reports feeling similarly in the past when doctors were concerned she had a \"leaky gut\" but she didn't need surgery, NGT, etc at that time. Denies cp/pressure, palpitations, diaphoresis, SOB, HURT, dizziness / lightheadedness, syncope or near syncope, HA, vision changes, fevers, chills.     Currently Ms. Hanley states that her abdominal pain has resolved.  She has an NG tube in place awaiting xray confirmation of placement.  She notes that she has been passing gas today.  There is no history of PUD or Gastritis.  Her last colonoscopy was done in 2023.        Past Medical History  PMHx s/f HTN, HLD, CKD III, secondary hyperparathyroidism, hypothyroidism, gout, GERD, chronic rhinitis, obesity    Surgical History  S/P KIANA     Social History  Career [employer] x 26 years; CKD believed to be due to chronic NSAID use during her time in the     Family History  No family " "history on file.     Allergies  Lisinopril, Penicillin, and Penicillins    Review of Systems  Constitutional:  Denies chills, fever, night sweats, weight loss.   Eyes:  Negative for photophobia and visual disturbance.   Respiratory:  Denies SOB, cough, wheezing.    Cardiovascular:  Negative for chest pain, palpitations and leg swelling.   Gastrointestinal:  See HPI  Endocrine: Negative for polydipsia and polyuria, heat or cold intolerance   Genitourinary:  Negative for decreased urine volume, dysuria, hematuria and urgency.   Skin:  Negative for color change and rash.   Neurological:  Negative for dizziness, tremors, syncope, weakness and light-headedness.   Psychiatric/Behavioral:  Negative for confusion and sleep disturbance.         Physical Exam  GA: WD, WN BF, AAO x 3, in NAD, pleasant and cooperative  HEAD: NC/AT  EYES: EOMI, no scleral icterus  NECK: Supple, no adenopathy, trachea is midline  LUNGS: CTA, Good BS's bilaterally  CVS: Normal S1, S2, RRR, no gallops or murmurs  ABDOMEN: NABS, protuberant but soft, non-tender; NG tube in place; no masses or hernias  EXTREMITIES: No edema; FROM, no deformities; palpable pedal pulses bilaterally  NEURO: GCS-15, no obvious deficits; CN's II-XII intact  PSYCH: No cognitive deficits; good recent and remote memory; normal mood and affect       Last Recorded Vitals  Blood pressure 157/75, pulse 51, temperature 36.6 °C (97.9 °F), temperature source Temporal, resp. rate 16, height 1.676 m (5' 6\"), weight 90.7 kg (200 lb), SpO2 95%.    Relevant Results   Latest Reference Range & Units 01/21/25 16:12   GLUCOSE 74 - 99 mg/dL 109 (H)   SODIUM 136 - 145 mmol/L 135 (L)   POTASSIUM 3.5 - 5.3 mmol/L 4.9   CHLORIDE 98 - 107 mmol/L 102   Bicarbonate 21 - 32 mmol/L 26   Anion Gap 10 - 20 mmol/L 12   Blood Urea Nitrogen 6 - 23 mg/dL 21   Creatinine 0.50 - 1.05 mg/dL 1.54 (H)   EGFR >60 mL/min/1.73m*2 36 (L)   Calcium 8.6 - 10.3 mg/dL 10.7 (H)   Albumin 3.4 - 5.0 g/dL 4.5   Alkaline " Phosphatase 33 - 136 U/L 107   ALT 7 - 45 U/L 21   AST 9 - 39 U/L 16   Bilirubin Total 0.0 - 1.2 mg/dL 0.5   Total Protein 6.4 - 8.2 g/dL 8.4 (H)   Lactate 0.4 - 2.0 mmol/L 0.9   LIPASE 9 - 82 U/L 26   Troponin I, High Sensitivity 0 - 13 ng/L 4   WBC 4.4 - 11.3 x10*3/uL 8.1   nRBC 0.0 - 0.0 /100 WBCs 0.0   RBC 4.00 - 5.20 x10*6/uL 5.21 (H)   HEMOGLOBIN 12.0 - 16.0 g/dL 15.8   HEMATOCRIT 36.0 - 46.0 % 47.2 (H)   MCV 80 - 100 fL 91   MCH 26.0 - 34.0 pg 30.3   MCHC 32.0 - 36.0 g/dL 33.5   RED CELL DISTRIBUTION WIDTH 11.5 - 14.5 % 13.2   Platelets 150 - 450 x10*3/uL 406   Neutrophils % 40.0 - 80.0 % 70.7   Immature Granulocytes %, Automated 0.0 - 0.9 % 0.1   Lymphocytes % 13.0 - 44.0 % 21.0   Monocytes % 2.0 - 10.0 % 7.3   Eosinophils % 0.0 - 6.0 % 0.5   Basophils % 0.0 - 2.0 % 0.4   Neutrophils Absolute 1.20 - 7.70 x10*3/uL 5.71   Immature Granulocytes Absolute, Automated 0.00 - 0.70 x10*3/uL 0.01   Lymphocytes Absolute 1.20 - 4.80 x10*3/uL 1.70   Monocytes Absolute 0.10 - 1.00 x10*3/uL 0.59   Eosinophils Absolute 0.00 - 0.70 x10*3/uL 0.04   Basophils Absolute 0.00 - 0.10 x10*3/uL 0.03   (H): Data is abnormally high  (L): Data is abnormally low              Narrative & Impression   STUDY:  CT Abdomen and Pelvis without IV Contrast; 1/21/2025 18:03  INDICATION:  Nausea, vomiting, abdominal pain.  COMPARISON:  None Available.  ACCESSION NUMBER(S):  [id number]  ORDERING CLINICIAN:  MARIA T GRANADOS  TECHNIQUE:  CT of the abdomen and pelvis was performed.  Contiguous axial images  were obtained at 3 mm slice thickness through the abdomen and pelvis.   Coronal and sagittal reconstructions at 3 mm slice thickness were  performed. No intravenous contrast was administered.    Automated mA/kV exposure control was utilized and patient examination  was performed in strict accordance with principles of ALARA.  FINDINGS:  Please note that the evaluation of vessels, lymph nodes and organs is  limited without intravenous contrast.       LOWER CHEST:  No cardiomegaly.  No pericardial effusion.  Lung bases are clear.     ABDOMEN:     LIVER:  No hepatomegaly.  Smooth surface contour.  Mild hepatic steatosis.  No  discrete lesions are evident.     BILE DUCTS:  No intrahepatic or extrahepatic biliary ductal dilatation.     GALLBLADDER:  Tiny gallstone.  No pericholecystic fluid.    STOMACH AND DUODENUM:  Stomach is unremarkable.  Duodenal diverticulum measuring 2.7 x 2.3  cm.     PANCREAS:  Unremarkable     SPLEEN:  No splenomegaly or focal splenic lesion.     ADRENAL GLANDS:  No thickening or nodules.     KIDNEYS AND URETERS:  Kidneys are normal in size and location.  No renal/ureteral stones.   No perinephric collection.  No hydronephrosis.       PELVIS:     BLADDER:  No abnormalities identified.     REPRODUCTIVE ORGANS:  No adnexal masses.  Uterus is absent.      BOWEL:  Sigmoid diverticulosis without acute diverticulitis.  The appendix is  normal.    There is a small bowel obstruction in the right lower quadrant with  dilated segment containing fluid and fecal-like material and mild  associated mesenteric edema.  There are two sites of relative  transition involving this abnormal segment of small bowel suggesting a  closed loop obstruction.  No obstructing mass is identified.    No ventral hernia or inguinal hernia is evident.  No pneumatosis.   Distal ileum is nondilated.       VESSELS:  Abdominal aorta is normal in caliber.      PERITONEUM/RETROPERITONEUM/LYMPH NODES:  No free fluid.  No pneumoperitoneum.  No lymphadenopathy.     ABDOMINAL WALL:  No abnormalities identified.  SOFT TISSUES:   No abnormalities identified.     BONES:  No acute fracture or aggressive osseous lesion.  Multilevel lumbar  facet arthropathy.    IMPRESSION:  Small bowel obstruction in the right lower quadrant. Closed loop  obstruction is not excluded.  Surgery consultation recommended.  There  is mild mesenteric edema and minimal free fluid without a  drainable  collection.  No free air or pneumatosis.  Limited assessment for bowel  viability/ischemic change without contrast.  Dr. Benitez discussed the critical findings which were acknowledged  by phone with Mayo Eubanks at 1930 hours on 1/21/2025.  The appendix is normal.  Signed by Ceferino Benitez DO               Narrative & Impression   STUDY:  Abdomen Radiographs;  1/21/2025 at 9:02 PM  INDICATION:  NG tube placement.  COMPARISON:  CT abdomen and pelvis 1/21/2025.  ACCESSION NUMBER(S):  [id number]  ORDERING CLINICIAN:  TECHNIQUE:  One view(s) of the abdomen.  FINDINGS:    Bowel gas pattern is normal without obstruction or ileus.  There are  no convincing calculi or abnormal calcifications.  No focal osseous  abnormalities.  There is an enteric tube noted with the tip located in  the left upper quadrant likely within the stomach.  IMPRESSION:  Enteric tube tip located in the left upper quadrant likely within the  stomach..  Signed by Dmitri Hopkins MD                  Assessment/Plan     Based on this patient's history, imaging and clinical evaluation, mechanical bowel obstruction is less likely than Gastroenteritis to be the cause of her symptoms, although not entirely excluded.  Her symptoms have resolved.  We will likely have her NG tube removed and start trial of clear liquid diet.  Clinically she is doing well at this time with no indications for acute surgical intervention.    I spent 60 minutes in the professional and overall care of this patient.      Alfred Quinteros MD

## 2025-01-22 NOTE — H&P
"[facility] - GENERAL MEDICINE HISTORY AND PHYSICAL    History Obtained From (Primary Source): Patient  Collateral History (Secondary Sources): D/w family at bedside, ED    History Of Present Illness (HPI):  Naheed Hanley is a 69 y.o. female with PMHx s/f HTN, HLD, CKD III, secondary hyperparathyroidism, hypothyroidism, gout, GERD, chronic rhinitis, obesity, presenting with 1d h/o nausea, vomiting, abdominal pain. Pt reports that she was in her typical state of health until shortly after she ate some chicken ~1700 on 01/20/2025 which she believes may have been undercooked. About 15 minutes after eating, she developed generalized abdominal discomfort, nausea, and had NBNB emesis. She had a few episodes of loose stool (no melena, hematochezia). She continued to feel unwell into the morning today. She has not had BM since the initial loose stool yesterday. She tried Pepto bismol and clear soda to alleviate her symptoms, but reports it was \"like a science experiment\" because it immediately exploded back out of her. She reports generalized abdominal discomfort, worst now in the RLQ, but significantly improved since arrival to the ED. Her only prior abdominal surgery is a hysterectomy. She reports feeling similarly in the past when doctors were concerned she had a \"leaky gut\" but she didn't need surgery, NGT, etc at that time. Denies cp/pressure, palpitations, diaphoresis, SOB, HURT, dizziness / lightheadedness, syncope or near syncope, HA, vision changes, fevers, chills.     ED Course (Summary - please note all labs, imaging studies, and interventions noted below have been personally reviewed and/or interpreted on day of admission):   Vitals on presentation: T98.2, /67, HR 76, RR 16, SpO2 96% RA  Labs: CBC with WBC 8.1, Hgb 15.8, platelets 4 6.  CMP with glucose 109, sodium 135, potassium 4.9, BUN 21, serum creatinine 1.54, calcium 10.7, albumin 4.5, alk phos 107, ALT 21, AST 16, total bili 0.5.  " Lactate 0.9.  Lipase 26.  High-sensitivity troponin 4.  COVID, flu A/B-.  EKG: Sinus without acute ST-T changes  Imaging: CT abdomen pelvis without IV contrast is notable for small bowel obstruction in the right lower quadrant with a closed-loop obstruction not excluded, mild mesenteric edema and minimal free fluid without a drainable collection, no free air or pneumatosis  Interventions: 1 L normal saline, 4 mg morphine, 4 mg Zofran, 12.5 mg Phenergan, surgery consultation, admitted to medicine    12-point ROS reviewed and found to be negative aside from aforementioned positives in HPI and/or noted in dedicated ROS section below.     ED Course (From ED Provider):  ED Course as of 01/21/25 2015 Tue Jan 21, 2025 1852 Patient reassessed at this time.  She still splinting some nausea and abdominal pain.  Patient will be able to get a ride home.  Patient currently receiving IV fluid bolus due to her elevated creatinine.  Patient will have requested nausea med ordered as well as pain medication. [CJ]   1852 EKG performed 1/21/2025 at 1615.  Sinus rhythm with ventricular of 60 bpm, parable 160 ms, QT/QTc of 401/401 ms.  No STEMI.  Interpreted by attending physician. [CJ]   1930 Updated by radiology patient does appear to have small bowel obstruction with transition point in right lower quadrant. [CJ]   1936 Updated patient on the plan of care.  I did inform her that she does have SBO present.  Patient complained of some nausea on reevaluation earlier however she has not had any vomiting while here. [CJ]   1943 Spoke to the on-call surgeon Dr. Quinteros who states that he would like to hold off on the NG tube at this time.  He does request to be admitted to the hospital service with consult to surgery. [CJ]   1956 Spoke to Dr. Quinteros again he did reevaluate patient CT imaging and does request she have NG tube placed. [CJ]   2007 Spoke to the hospitalist TRE Norman who agreed to admit the patient for observation under  Dr. Genao. [CJ]      ED Course User Index  [CJ] Mayo Mello PA-C         Diagnoses as of 01/21/25 2015   SBO (small bowel obstruction) (Multi)     Relevant Results  Results for orders placed or performed during the hospital encounter of 01/21/25 (from the past 24 hours)   CBC and Auto Differential   Result Value Ref Range    WBC 8.1 4.4 - 11.3 x10*3/uL    nRBC 0.0 0.0 - 0.0 /100 WBCs    RBC 5.21 (H) 4.00 - 5.20 x10*6/uL    Hemoglobin 15.8 12.0 - 16.0 g/dL    Hematocrit 47.2 (H) 36.0 - 46.0 %    MCV 91 80 - 100 fL    MCH 30.3 26.0 - 34.0 pg    MCHC 33.5 32.0 - 36.0 g/dL    RDW 13.2 11.5 - 14.5 %    Platelets 406 150 - 450 x10*3/uL    Neutrophils % 70.7 40.0 - 80.0 %    Immature Granulocytes %, Automated 0.1 0.0 - 0.9 %    Lymphocytes % 21.0 13.0 - 44.0 %    Monocytes % 7.3 2.0 - 10.0 %    Eosinophils % 0.5 0.0 - 6.0 %    Basophils % 0.4 0.0 - 2.0 %    Neutrophils Absolute 5.71 1.20 - 7.70 x10*3/uL    Immature Granulocytes Absolute, Automated 0.01 0.00 - 0.70 x10*3/uL    Lymphocytes Absolute 1.70 1.20 - 4.80 x10*3/uL    Monocytes Absolute 0.59 0.10 - 1.00 x10*3/uL    Eosinophils Absolute 0.04 0.00 - 0.70 x10*3/uL    Basophils Absolute 0.03 0.00 - 0.10 x10*3/uL   Comprehensive metabolic panel   Result Value Ref Range    Glucose 109 (H) 74 - 99 mg/dL    Sodium 135 (L) 136 - 145 mmol/L    Potassium 4.9 3.5 - 5.3 mmol/L    Chloride 102 98 - 107 mmol/L    Bicarbonate 26 21 - 32 mmol/L    Anion Gap 12 10 - 20 mmol/L    Urea Nitrogen 21 6 - 23 mg/dL    Creatinine 1.54 (H) 0.50 - 1.05 mg/dL    eGFR 36 (L) >60 mL/min/1.73m*2    Calcium 10.7 (H) 8.6 - 10.3 mg/dL    Albumin 4.5 3.4 - 5.0 g/dL    Alkaline Phosphatase 107 33 - 136 U/L    Total Protein 8.4 (H) 6.4 - 8.2 g/dL    AST 16 9 - 39 U/L    Bilirubin, Total 0.5 0.0 - 1.2 mg/dL    ALT 21 7 - 45 U/L   Lipase   Result Value Ref Range    Lipase 26 9 - 82 U/L   Lactate   Result Value Ref Range    Lactate 0.9 0.4 - 2.0 mmol/L   Troponin I, High Sensitivity   Result Value Ref  Range    Troponin I, High Sensitivity 4 0 - 13 ng/L   Sars-CoV-2 PCR   Result Value Ref Range    Coronavirus 2019, PCR Not Detected Not Detected   Influenza A, and B PCR   Result Value Ref Range    Flu A Result Not Detected Not Detected    Flu B Result Not Detected Not Detected      CT abdomen pelvis wo IV contrast    Result Date: 1/21/2025  STUDY: CT Abdomen and Pelvis without IV Contrast; 1/21/2025 18:03 INDICATION: Nausea, vomiting, abdominal pain. COMPARISON: None Available. ACCESSION NUMBER(S): [id number] ORDERING CLINICIAN: MARIA T GRANADOS TECHNIQUE: CT of the abdomen and pelvis was performed.  Contiguous axial images were obtained at 3 mm slice thickness through the abdomen and pelvis. Coronal and sagittal reconstructions at 3 mm slice thickness were performed. No intravenous contrast was administered.  Automated mA/kV exposure control was utilized and patient examination was performed in strict accordance with principles of ALARA. FINDINGS: Please note that the evaluation of vessels, lymph nodes and organs is limited without intravenous contrast.  LOWER CHEST: No cardiomegaly.  No pericardial effusion.  Lung bases are clear.  ABDOMEN:  LIVER: No hepatomegaly.  Smooth surface contour.  Mild hepatic steatosis.  No discrete lesions are evident.  BILE DUCTS: No intrahepatic or extrahepatic biliary ductal dilatation.  GALLBLADDER: Tiny gallstone.  No pericholecystic fluid.  STOMACH AND DUODENUM: Stomach is unremarkable.  Duodenal diverticulum measuring 2.7 x 2.3 cm.  PANCREAS: Unremarkable  SPLEEN: No splenomegaly or focal splenic lesion.  ADRENAL GLANDS: No thickening or nodules.  KIDNEYS AND URETERS: Kidneys are normal in size and location.  No renal/ureteral stones. No perinephric collection.  No hydronephrosis.   PELVIS:  BLADDER: No abnormalities identified.  REPRODUCTIVE ORGANS: No adnexal masses.  Uterus is absent.  BOWEL: Sigmoid diverticulosis without acute diverticulitis.  The appendix is normal.  There  is a small bowel obstruction in the right lower quadrant with dilated segment containing fluid and fecal-like material and mild associated mesenteric edema.  There are two sites of relative transition involving this abnormal segment of small bowel suggesting a closed loop obstruction.  No obstructing mass is identified.  No ventral hernia or inguinal hernia is evident.  No pneumatosis. Distal ileum is nondilated.   VESSELS: Abdominal aorta is normal in caliber.  PERITONEUM/RETROPERITONEUM/LYMPH NODES: No free fluid.  No pneumoperitoneum. No lymphadenopathy.  ABDOMINAL WALL: No abnormalities identified. SOFT TISSUES: No abnormalities identified.  BONES: No acute fracture or aggressive osseous lesion.  Multilevel lumbar facet arthropathy.      Small bowel obstruction in the right lower quadrant. Closed loop obstruction is not excluded.  Surgery consultation recommended.  There is mild mesenteric edema and minimal free fluid without a drainable collection.  No free air or pneumatosis.  Limited assessment for bowel viability/ischemic change without contrast. Dr. Benitez discussed the critical findings which were acknowledged by phone with Mayo Eubanks at 1930 hours on 1/21/2025. The appendix is normal. Signed by Ceferino Benitez DO    Scheduled medications:  enoxaparin, 40 mg, subcutaneous, q24h  [Held by provider] levothyroxine, 112 mcg, oral, Daily  [START ON 1/22/2025] pantoprazole, 40 mg, oral, Daily before breakfast   Or  [START ON 1/22/2025] pantoprazole, 40 mg, intravenous, Daily before breakfast      Continuous medications:  lactated Ringer's, 75 mL/hr      PRN medications:  PRN medications: bisacodyl, bisacodyl, ondansetron **OR** ondansetron     Past Medical History  She has no past medical history on file.    Surgical History  Hysterectomy   Knee surgery      Social History  Former smoker  Denies alcohol, recreational substances   Served in the [employer]  ([employer]    Family History  No family history on  file.    Allergies  Lisinopril, Penicillin, and Penicillins    Code Status  Full Code     Review of Systems   Constitutional:  Positive for appetite change. Negative for chills and fever.   Eyes:  Negative for photophobia and visual disturbance.   Respiratory:  Negative for cough and shortness of breath.    Cardiovascular:  Negative for chest pain, palpitations and leg swelling.   Gastrointestinal:  Positive for abdominal pain, diarrhea (Initially as per HPI), nausea and vomiting. Negative for blood in stool.   Endocrine: Negative for polydipsia and polyuria.   Genitourinary:  Negative for decreased urine volume, dysuria, hematuria and urgency.   Skin:  Negative for color change and rash.   Neurological:  Negative for dizziness, tremors, syncope, weakness and light-headedness.   Psychiatric/Behavioral:  Negative for confusion and sleep disturbance.    All other systems reviewed and are negative.    Last Recorded Vitals  /60 (BP Location: Right arm, Patient Position: Sitting)   Pulse (!) 103   Temp 36.8 °C (98.2 °F) (Skin)   Resp 18   Wt 90.7 kg (200 lb)   SpO2 100%      Physical Exam:  Vital signs and nursing notes reviewed.   Constitutional: Pleasant and cooperative. Laying in bed in no acute distress. Conversant. Slightly [place].   Skin: Warm and dry; no obvious lesions, rashes, pallor, or jaundice.   Eyes: EOMI. Anicteric sclera.   ENT: Mucous membranes dry; no obvious injury or deformity appreciated.   Head and Neck: Normocephalic, atraumatic. ROM preserved. Trachea midline. No appreciable JVD.   Respiratory: Nonlabored on RA. Lungs clear to auscultation bilaterally without obvious adventitious sounds. Chest rise is equal.  Cardiovascular: RRR. No gross murmur, gallop, or rub. Extremities are warm and well-perfused with good capillary refill (< 3 seconds). No chest wall tenderness.   GI: Abdomen soft, nondistended, generalized tenderness with deep palpation. No obvious organomegaly appreciated.  Hypoactive bowel sounds.   : No CVA tenderness.   MSK: No gross abnormalities appreciated. No limitations to AROM/PROM appreciated.   Extremities: No cyanosis, edema, or clubbing evident. Neurovascularly intact.   Neuro: A&Ox3. CN 2-12 grossly intact. Able to respond to questions appropriately and clearly. No acute focal neurologic deficits appreciated.  Psych: Appropriate mood and behavior.    Assessment/Plan     69 y.o. female with PMHx s/f HTN, HLD, CKD III, secondary hyperparathyroidism, hypothyroidism, gout, GERD, chronic rhinitis, obesity, presenting with 1d h/o nausea, vomiting, abdominal pain.    SBO   -NGT ordered, pending patient transferring into room for placement and suction capabilities   -LIS   -NPO  -Gentle IVF  -Pain control   -Monitor abd exam   -Surgery consultation appreciated     Acute Kidney Injury on CKD III  -Baseline serum creatinine: ~1.1-1.2 (from our limited labs available to review)  -Creatinine at admission: 1.54   -Suspect secondary to decreased solute intake, nausea, vomiting (GI losses)   -IVF: s/p 1L NS in ED. Continue on mIVF with LR @ 75cc/hr overnight  -Recheck renal function panel in AM   -Nephrology consultation if not improving, appreciate further recommendations     Hypercalcemia, mild   -Ca 10.7 on presentation (from metabolic panel) with no overt associated symptoms   -Suspect this is likely related to dehydration acutely   -Has h/o secondary hyperparathyroidism   -PTH has been ordered by surgical service on admit as well for additional evaluation     Hyponatremia, mild   -Na 135  -Likely 2/2 diminished solute intake and increased losses   -Continue volume expansion and trend while admitted     HTN, HLD   -BP controlled on presentation   -May need to utilize IV meds vs patch (such as clonidine) while NPO if BP starts to elevate. Per protocol will keep patient on tele in case there is need for this.     Hypothyroidism   -Continue home synthroid when able to take PO    -Consider IV formulation if prolonged NPO status     GERD   -Continue PPI    Obesity (BMI 32.28)   -Patient does not meet morbid/severe criteria for obesity on admission   -Continued outpatient follow-up with PCP, healthy dietary and lifestyle changes as able      SNHL   -Mild b/l hearing loss at baseline   -Continue follow-up outpatient as scheduled     Diet: NPO  DVT Prophylaxis: SCDs, Lovenox   Code Status: Full Code   Case Discussed With: ED provider  Additional Sources Reviewed: ED note day of admission; last available outpatient notes     Anticipated Length of Stay (LOS): Patient will require >2 midnight stay for further evaluation and management, monitoring for ROBF.     Ester Douglas PA-C    Dragon dictation software was used to dictate this note and thus there may be minor errors in translation/transcription including garbled speech or misspellings. Please contact for clarification if needed.

## 2025-01-22 NOTE — DISCHARGE SUMMARY
"Discharge Diagnosis      Naheed Hanley is a 69 y.o. female with PMHx s/f HTN, HLD, CKD III, secondary hyperparathyroidism, hypothyroidism, gout, GERD, chronic rhinitis, obesity, presenting with 1d h/o nausea, vomiting, abdominal pain. Pt reports that she was in her typical state of health until shortly after she ate some chicken ~1700 on 01/20/2025 which she believes may have been undercooked. About 15 minutes after eating, she developed generalized abdominal discomfort, nausea, and had NBNB emesis. She had a few episodes of loose stool (no melena, hematochezia). She continued to feel unwell into the morning today. She has not had BM since the initial loose stool yesterday. She tried Pepto bismol and clear soda to alleviate her symptoms, but reports it was \"like a science experiment\" because it immediately exploded back out of her. She reports generalized abdominal discomfort, worst now in the RLQ, but significantly improved since arrival to the ED. Her only prior abdominal surgery is a hysterectomy. She reports feeling similarly in the past when doctors were concerned she had a \"leaky gut\" but she didn't need surgery, NGT, etc at that time. Denies cp/pressure, palpitations, diaphoresis, SOB, HURT, dizziness / lightheadedness, syncope or near syncope, HA, vision changes, fevers, chills.      ED Course (Summary - please note all labs, imaging studies, and interventions noted below have been personally reviewed and/or interpreted on day of admission):   Vitals on presentation: T98.2, /67, HR 76, RR 16, SpO2 96% RA  Labs: CBC with WBC 8.1, Hgb 15.8, platelets 4 6.  CMP with glucose 109, sodium 135, potassium 4.9, BUN 21, serum creatinine 1.54, calcium 10.7, albumin 4.5, alk phos 107, ALT 21, AST 16, total bili 0.5.  Lactate 0.9.  Lipase 26.  High-sensitivity troponin 4.  COVID, flu A/B-.  EKG: Sinus without acute ST-T changes  Imaging: CT abdomen pelvis without IV contrast is notable for small bowel obstruction " in the right lower quadrant with a closed-loop obstruction not excluded, mild mesenteric edema and minimal free fluid without a drainable collection, no free air or pneumatosis  Interventions: 1 L normal saline, 4 mg morphine, 4 mg Zofran, 12.5 mg Phenergan, surgery consultation, admitted to medicine     1/22: Patient seen and examined this morning. CT abdomen pelvis showed small bowel obstruction in the right lower quadrant with dilated segment containing fluid and fecal-like material and mild associated mesenteric edema. Patient reports her symptoms have completely resolved. Per general surgery consult, mechanical obstruction is less likely than gastroenteritis (although not entirely excluded), no indications for surgical intervention at this time. NG tube has been removed. Patient on clear liquid diet and is doing well. Na 135, Cr elevated to 1.74. Continue to monitor symptoms and labs.      Addendum patient known chronic kidney disease with some acute kidney injury.  Suspect this is food poisoning that is improving.  Will will advance diet reevaluate later today.        Issues Requiring Follow-Up  Continue to drink plenty of fluids at home.  Outpatient follow-up with your regular doctor in 5 to 10 days.    Discharge Meds     Medication List      START taking these medications     ondansetron 4 mg tablet; Commonly known as: Zofran; Take 1 tablet (4 mg)   by mouth every 8 hours if needed for vomiting or nausea.     CONTINUE taking these medications     loratadine 10 mg tablet; Commonly known as: Claritin   Synthroid 112 mcg tablet; Generic drug: levothyroxine     STOP taking these medications     indomethacin 25 mg capsule; Commonly known as: Indocin       Test Results Pending At Discharge  Pending Labs       Order Current Status    PTH, Intact In process            Hospital Course   Naheed Hanley is a 69 y.o. female with PMHx s/f HTN, HLD, CKD III, secondary hyperparathyroidism, hypothyroidism, gout, GERD,  "chronic rhinitis, obesity, presenting with 1d h/o nausea, vomiting, abdominal pain. Pt reports that she was in her typical state of health until shortly after she ate some chicken ~1700 on 01/20/2025 which she believes may have been undercooked. About 15 minutes after eating, she developed generalized abdominal discomfort, nausea, and had NBNB emesis. She had a few episodes of loose stool (no melena, hematochezia). She continued to feel unwell into the morning today. She has not had BM since the initial loose stool yesterday. She tried Pepto bismol and clear soda to alleviate her symptoms, but reports it was \"like a science experiment\" because it immediately exploded back out of her. She reports generalized abdominal discomfort, worst now in the RLQ, but significantly improved since arrival to the ED. Her only prior abdominal surgery is a hysterectomy. She reports feeling similarly in the past when doctors were concerned she had a \"leaky gut\" but she didn't need surgery, NGT, etc at that time. Denies cp/pressure, palpitations, diaphoresis, SOB, HURT, dizziness / lightheadedness, syncope or near syncope, HA, vision changes, fevers, chills.      ED Course (Summary - please note all labs, imaging studies, and interventions noted below have been personally reviewed and/or interpreted on day of admission):   Vitals on presentation: T98.2, /67, HR 76, RR 16, SpO2 96% RA  Labs: CBC with WBC 8.1, Hgb 15.8, platelets 4 6.  CMP with glucose 109, sodium 135, potassium 4.9, BUN 21, serum creatinine 1.54, calcium 10.7, albumin 4.5, alk phos 107, ALT 21, AST 16, total bili 0.5.  Lactate 0.9.  Lipase 26.  High-sensitivity troponin 4.  COVID, flu A/B-.  EKG: Sinus without acute ST-T changes  Imaging: CT abdomen pelvis without IV contrast is notable for small bowel obstruction in the right lower quadrant with a closed-loop obstruction not excluded, mild mesenteric edema and minimal free fluid without a drainable collection, no " free air or pneumatosis  Interventions: 1 L normal saline, 4 mg morphine, 4 mg Zofran, 12.5 mg Phenergan, surgery consultation, admitted to medicine     1/22: Patient seen and examined this morning. CT abdomen pelvis showed small bowel obstruction in the right lower quadrant with dilated segment containing fluid and fecal-like material and mild associated mesenteric edema. Patient reports her symptoms have completely resolved. Per general surgery consult, mechanical obstruction is less likely than gastroenteritis (although not entirely excluded), no indications for surgical intervention at this time. NG tube has been removed. Patient on clear liquid diet and is doing well. Na 135, Cr elevated to 1.74. Continue to monitor symptoms and labs.      Addendum patient known chronic kidney disease with some acute kidney injury.  Suspect this is food poisoning that is improving.  Will will advance diet reevaluate later today.     See after visit summary for complete plan    35 minutes spent in the care of this patient.    Pertinent Physical Exam At Time of Discharge  Physical Exam  See today's progress note for more physical exam findings  Outpatient Follow-Up  No future appointments.      Juan Akbar MD

## 2025-01-22 NOTE — PROGRESS NOTES
"Naheed Hanley is a 69 y.o. female on day 1 of admission presenting with SBO (small bowel obstruction) (Multi).      Subjective   Naheed Hanley is a 69 y.o. female with PMHx s/f HTN, HLD, CKD III, secondary hyperparathyroidism, hypothyroidism, gout, GERD, chronic rhinitis, obesity, presenting with 1d h/o nausea, vomiting, abdominal pain. Pt reports that she was in her typical state of health until shortly after she ate some chicken ~1700 on 01/20/2025 which she believes may have been undercooked. About 15 minutes after eating, she developed generalized abdominal discomfort, nausea, and had NBNB emesis. She had a few episodes of loose stool (no melena, hematochezia). She continued to feel unwell into the morning today. She has not had BM since the initial loose stool yesterday. She tried Pepto bismol and clear soda to alleviate her symptoms, but reports it was \"like a science experiment\" because it immediately exploded back out of her. She reports generalized abdominal discomfort, worst now in the RLQ, but significantly improved since arrival to the ED. Her only prior abdominal surgery is a hysterectomy. She reports feeling similarly in the past when doctors were concerned she had a \"leaky gut\" but she didn't need surgery, NGT, etc at that time. Denies cp/pressure, palpitations, diaphoresis, SOB, HURT, dizziness / lightheadedness, syncope or near syncope, HA, vision changes, fevers, chills.      ED Course (Summary - please note all labs, imaging studies, and interventions noted below have been personally reviewed and/or interpreted on day of admission):   Vitals on presentation: T98.2, /67, HR 76, RR 16, SpO2 96% RA  Labs: CBC with WBC 8.1, Hgb 15.8, platelets 4 6.  CMP with glucose 109, sodium 135, potassium 4.9, BUN 21, serum creatinine 1.54, calcium 10.7, albumin 4.5, alk phos 107, ALT 21, AST 16, total bili 0.5.  Lactate 0.9.  Lipase 26.  High-sensitivity troponin 4.  COVID, flu A/B-.  EKG: Sinus " without acute ST-T changes  Imaging: CT abdomen pelvis without IV contrast is notable for small bowel obstruction in the right lower quadrant with a closed-loop obstruction not excluded, mild mesenteric edema and minimal free fluid without a drainable collection, no free air or pneumatosis  Interventions: 1 L normal saline, 4 mg morphine, 4 mg Zofran, 12.5 mg Phenergan, surgery consultation, admitted to medicine    1/22: Patient seen and examined this morning. CT abdomen pelvis showed small bowel obstruction in the right lower quadrant with dilated segment containing fluid and fecal-like material and mild associated mesenteric edema. Patient reports her symptoms have completely resolved. Per general surgery consult, mechanical obstruction is less likely than gastroenteritis (although not entirely excluded), no indications for surgical intervention at this time. NG tube has been removed. Patient on clear liquid diet and is doing well. Na 135, Cr elevated to 1.74. Continue to monitor symptoms and labs.     Addendum patient known chronic kidney disease with some acute kidney injury.  Suspect this is food poisoning that is improving.  Will will advance diet reevaluate later today.       Objective     Last Recorded Vitals  /72 (BP Location: Left arm, Patient Position: Lying)   Pulse 55 Comment: rn notifed  Temp 36.7 °C (98 °F) (Temporal)   Resp 16   Wt 90.7 kg (200 lb)   SpO2 94%   Intake/Output last 3 Shifts:    Intake/Output Summary (Last 24 hours) at 1/22/2025 0904  Last data filed at 1/22/2025 0250  Gross per 24 hour   Intake 1366.25 ml   Output 125 ml   Net 1241.25 ml       Admission Weight  Weight: 90.7 kg (200 lb) (01/21/25 1450)    Daily Weight  01/21/25 : 90.7 kg (200 lb)    Image Results  ECG 12 lead  Sinus rhythm  ST elevation, consider inferior injury    Results for orders placed or performed during the hospital encounter of 01/21/25 (from the past 24 hours)   CBC and Auto Differential   Result  Value Ref Range    WBC 8.1 4.4 - 11.3 x10*3/uL    nRBC 0.0 0.0 - 0.0 /100 WBCs    RBC 5.21 (H) 4.00 - 5.20 x10*6/uL    Hemoglobin 15.8 12.0 - 16.0 g/dL    Hematocrit 47.2 (H) 36.0 - 46.0 %    MCV 91 80 - 100 fL    MCH 30.3 26.0 - 34.0 pg    MCHC 33.5 32.0 - 36.0 g/dL    RDW 13.2 11.5 - 14.5 %    Platelets 406 150 - 450 x10*3/uL    Neutrophils % 70.7 40.0 - 80.0 %    Immature Granulocytes %, Automated 0.1 0.0 - 0.9 %    Lymphocytes % 21.0 13.0 - 44.0 %    Monocytes % 7.3 2.0 - 10.0 %    Eosinophils % 0.5 0.0 - 6.0 %    Basophils % 0.4 0.0 - 2.0 %    Neutrophils Absolute 5.71 1.20 - 7.70 x10*3/uL    Immature Granulocytes Absolute, Automated 0.01 0.00 - 0.70 x10*3/uL    Lymphocytes Absolute 1.70 1.20 - 4.80 x10*3/uL    Monocytes Absolute 0.59 0.10 - 1.00 x10*3/uL    Eosinophils Absolute 0.04 0.00 - 0.70 x10*3/uL    Basophils Absolute 0.03 0.00 - 0.10 x10*3/uL   Comprehensive metabolic panel   Result Value Ref Range    Glucose 109 (H) 74 - 99 mg/dL    Sodium 135 (L) 136 - 145 mmol/L    Potassium 4.9 3.5 - 5.3 mmol/L    Chloride 102 98 - 107 mmol/L    Bicarbonate 26 21 - 32 mmol/L    Anion Gap 12 10 - 20 mmol/L    Urea Nitrogen 21 6 - 23 mg/dL    Creatinine 1.54 (H) 0.50 - 1.05 mg/dL    eGFR 36 (L) >60 mL/min/1.73m*2    Calcium 10.7 (H) 8.6 - 10.3 mg/dL    Albumin 4.5 3.4 - 5.0 g/dL    Alkaline Phosphatase 107 33 - 136 U/L    Total Protein 8.4 (H) 6.4 - 8.2 g/dL    AST 16 9 - 39 U/L    Bilirubin, Total 0.5 0.0 - 1.2 mg/dL    ALT 21 7 - 45 U/L   Lipase   Result Value Ref Range    Lipase 26 9 - 82 U/L   Lactate   Result Value Ref Range    Lactate 0.9 0.4 - 2.0 mmol/L   Troponin I, High Sensitivity   Result Value Ref Range    Troponin I, High Sensitivity 4 0 - 13 ng/L   Sars-CoV-2 PCR   Result Value Ref Range    Coronavirus 2019, PCR Not Detected Not Detected   Influenza A, and B PCR   Result Value Ref Range    Flu A Result Not Detected Not Detected    Flu B Result Not Detected Not Detected   ECG 12 lead   Result Value Ref  Range    Ventricular Rate 60 BPM    Atrial Rate 60 BPM    NH Interval 160 ms    QRS Duration 90 ms    QT Interval 401 ms    QTC Calculation(Bazett) 401 ms    P Axis 32 degrees    R Axis 2 degrees    T Axis 68 degrees    QRS Count 10 beats    Q Onset 253 ms    T Offset 453 ms    QTC Fredericia 401 ms   CBC and Auto Differential   Result Value Ref Range    WBC 8.3 4.4 - 11.3 x10*3/uL    nRBC 0.0 0.0 - 0.0 /100 WBCs    RBC 4.46 4.00 - 5.20 x10*6/uL    Hemoglobin 13.3 12.0 - 16.0 g/dL    Hematocrit 40.7 36.0 - 46.0 %    MCV 91 80 - 100 fL    MCH 29.8 26.0 - 34.0 pg    MCHC 32.7 32.0 - 36.0 g/dL    RDW 13.6 11.5 - 14.5 %    Platelets 355 150 - 450 x10*3/uL    Neutrophils % 42.4 40.0 - 80.0 %    Immature Granulocytes %, Automated 0.2 0.0 - 0.9 %    Lymphocytes % 46.6 13.0 - 44.0 %    Monocytes % 9.1 2.0 - 10.0 %    Eosinophils % 1.2 0.0 - 6.0 %    Basophils % 0.5 0.0 - 2.0 %    Neutrophils Absolute 3.51 1.20 - 7.70 x10*3/uL    Immature Granulocytes Absolute, Automated 0.02 0.00 - 0.70 x10*3/uL    Lymphocytes Absolute 3.85 1.20 - 4.80 x10*3/uL    Monocytes Absolute 0.75 0.10 - 1.00 x10*3/uL    Eosinophils Absolute 0.10 0.00 - 0.70 x10*3/uL    Basophils Absolute 0.04 0.00 - 0.10 x10*3/uL   Comprehensive Metabolic Panel   Result Value Ref Range    Glucose 85 74 - 99 mg/dL    Sodium 135 (L) 136 - 145 mmol/L    Potassium 4.0 3.5 - 5.3 mmol/L    Chloride 105 98 - 107 mmol/L    Bicarbonate 24 21 - 32 mmol/L    Anion Gap 10 10 - 20 mmol/L    Urea Nitrogen 21 6 - 23 mg/dL    Creatinine 1.74 (H) 0.50 - 1.05 mg/dL    eGFR 31 (L) >60 mL/min/1.73m*2    Calcium 9.7 8.6 - 10.3 mg/dL    Albumin 4.0 3.4 - 5.0 g/dL    Alkaline Phosphatase 81 33 - 136 U/L    Total Protein 6.7 6.4 - 8.2 g/dL    AST 14 9 - 39 U/L    Bilirubin, Total 0.5 0.0 - 1.2 mg/dL    ALT 17 7 - 45 U/L   Magnesium   Result Value Ref Range    Magnesium 2.06 1.60 - 2.40 mg/dL      ECG 12 lead    Result Date: 1/22/2025  Sinus rhythm ST elevation, consider inferior  injury    XR abdomen 1 view    Result Date: 1/21/2025  Interpreted By:  Magui Morrison, STUDY: XR ABDOMEN 1 VIEW;  1/21/2025 10:22 pm   INDICATION: Signs/Symptoms:NG placement.     COMPARISON: Abdominal x-ray earlier same date 01/21/2025. CT abdomen and pelvis 01/21/2025.   ACCESSION NUMBER(S): [id number]   ORDERING CLINICIAN: ASA CHAUDHRY   FINDINGS: AP semi-erect view of the chest/abdomen was obtained.   An enteric tube terminates at the left upper quadrant, at the gastric fundus.   There is gaseous distention of the visualized bowel loops, better evaluated on earlier CT abdomen and pelvis.   The cardiomediastinal silhouette is within normal limits. No focal consolidation, pleural effusion or pneumothorax.       1.  Enteric tube terminates at the left upper quadrant, at the gastric fundus.   MACRO: None   Signed by: Magui Morrison 1/21/2025 11:18 PM Dictation workstation:   [id number]    XR abdomen 1 view    Result Date: 1/21/2025  STUDY: Abdomen Radiographs;  1/21/2025 at 9:02 PM INDICATION: NG tube placement. COMPARISON: CT abdomen and pelvis 1/21/2025. ACCESSION NUMBER(S): [id number] ORDERING CLINICIAN: TECHNIQUE:  One view(s) of the abdomen. FINDINGS:  Bowel gas pattern is normal without obstruction or ileus.  There are no convincing calculi or abnormal calcifications.  No focal osseous abnormalities.  There is an enteric tube noted with the tip located in the left upper quadrant likely within the stomach.    Enteric tube tip located in the left upper quadrant likely within the stomach.. Signed by Dmitri Hopkins MD    CT abdomen pelvis wo IV contrast    Result Date: 1/21/2025  STUDY: CT Abdomen and Pelvis without IV Contrast; 1/21/2025 18:03 INDICATION: Nausea, vomiting, abdominal pain. COMPARISON: None Available. ACCESSION NUMBER(S): [id number] ORDERING CLINICIAN: MARIA T GRANADOS TECHNIQUE: CT of the abdomen and pelvis was performed.  Contiguous axial images were obtained at 3 mm slice thickness through  the abdomen and pelvis. Coronal and sagittal reconstructions at 3 mm slice thickness were performed. No intravenous contrast was administered.  Automated mA/kV exposure control was utilized and patient examination was performed in strict accordance with principles of ALARA. FINDINGS: Please note that the evaluation of vessels, lymph nodes and organs is limited without intravenous contrast.  LOWER CHEST: No cardiomegaly.  No pericardial effusion.  Lung bases are clear.  ABDOMEN:  LIVER: No hepatomegaly.  Smooth surface contour.  Mild hepatic steatosis.  No discrete lesions are evident.  BILE DUCTS: No intrahepatic or extrahepatic biliary ductal dilatation.  GALLBLADDER: Tiny gallstone.  No pericholecystic fluid.  STOMACH AND DUODENUM: Stomach is unremarkable.  Duodenal diverticulum measuring 2.7 x 2.3 cm.  PANCREAS: Unremarkable  SPLEEN: No splenomegaly or focal splenic lesion.  ADRENAL GLANDS: No thickening or nodules.  KIDNEYS AND URETERS: Kidneys are normal in size and location.  No renal/ureteral stones. No perinephric collection.  No hydronephrosis.   PELVIS:  BLADDER: No abnormalities identified.  REPRODUCTIVE ORGANS: No adnexal masses.  Uterus is absent.  BOWEL: Sigmoid diverticulosis without acute diverticulitis.  The appendix is normal.  There is a small bowel obstruction in the right lower quadrant with dilated segment containing fluid and fecal-like material and mild associated mesenteric edema.  There are two sites of relative transition involving this abnormal segment of small bowel suggesting a closed loop obstruction.  No obstructing mass is identified.  No ventral hernia or inguinal hernia is evident.  No pneumatosis. Distal ileum is nondilated.   VESSELS: Abdominal aorta is normal in caliber.  PERITONEUM/RETROPERITONEUM/LYMPH NODES: No free fluid.  No pneumoperitoneum. No lymphadenopathy.  ABDOMINAL WALL: No abnormalities identified. SOFT TISSUES: No abnormalities identified.  BONES: No acute  fracture or aggressive osseous lesion.  Multilevel lumbar facet arthropathy.      Small bowel obstruction in the right lower quadrant. Closed loop obstruction is not excluded.  Surgery consultation recommended.  There is mild mesenteric edema and minimal free fluid without a drainable collection.  No free air or pneumatosis.  Limited assessment for bowel viability/ischemic change without contrast. Dr. Benitez discussed the critical findings which were acknowledged by phone with Mayo Eubanks at 1930 hours on 1/21/2025. The appendix is normal. Signed by Ceferino Benitez, DO      Scheduled medications  enoxaparin, 40 mg, subcutaneous, q24h  [Held by provider] levothyroxine, 112 mcg, oral, Daily  pantoprazole, 40 mg, oral, Daily before breakfast   Or  pantoprazole, 40 mg, intravenous, Daily before breakfast      Continuous medications  lactated Ringer's, 75 mL/hr, Last Rate: 75 mL/hr (01/22/25 0619)      PRN medications  PRN medications: bisacodyl, bisacodyl, morphine, morphine, ondansetron **OR** ondansetron     Physical Exam  General appearance: WN WD alert in no apparent distress. Non toxic appearing.     Skin: Skin is warm with normal color and moisture. Negative for rashes, lesions, ulcers. petechiae. Normal sun and age related skin changes.     Head: normocephalic atraumatic. no signs of deformity or edema.     ENT: Mucous membranes moist.     Neck: Supple. Trachea midline. No JVD.    Chest/lungs: all lobes CTA with no adventitious sounds. no use of accessory muscles. Effort normal.    Cardiovascular: Regular rate and franchesca. no m/g/r.     Abdomen: Soft, nontender, non-distended. Bowel sounds present in all 4 quadrants.     Extremities: No lower leg edema    Neuro/Psych: A&O x3 with appropriate behavior.     Relevant Results               Assessment/Plan      SBO  Acute Kidney Injury on CKD III  Hypercalcemia, mild   Hyponatremia, mild   HTN, HLD   Hypothyroidism   GERD   Obesity (BMI 32.28)   SNHL   DVT  Prophylaxis - SCDs, Lovenox sq    enoxaparin, 40 mg, subcutaneous, q24h  [Held by provider] levothyroxine, 112 mcg, oral, Daily  pantoprazole, 40 mg, oral, Daily before breakfast  lactated Ringer's, 75 mL/hr, Last Rate: 75 mL/hr (01/22/25 0619)  NG tube - removed  Advance diet  IV hydration  Maximize cardiovascular parameters  Avoid nephrotoxins  Check labs in am  See orders for complete plan        LOVELY REAVES

## 2025-01-22 NOTE — PROGRESS NOTES
01/22/25 1106   Discharge Planning   Living Arrangements Alone   Support Systems Friends/neighbors   Assistance Needed Independent   Type of Residence Private residence   Home or Post Acute Services None   Expected Discharge Disposition Home   Does the patient need discharge transport arranged? Yes   RoundTrip coordination needed? Yes   Has discharge transport been arranged? No   Financial Resource Strain   How hard is it for you to pay for the very basics like food, housing, medical care, and heating? Not hard   Housing Stability   In the last 12 months, was there a time when you were not able to pay the mortgage or rent on time? N   At any time in the past 12 months, were you homeless or living in a shelter (including now)? N   Transportation Needs   In the past 12 months, has lack of transportation kept you from medical appointments or from getting medications? no   In the past 12 months, has lack of transportation kept you from meetings, work, or from getting things needed for daily living? No     PCP is Lashaun Roman DO. Patient is from home alone with support from her neighbor. Patient is independent with ambulation, self care, driving, shopping, and meals.  PT/ OT pending. Patient is open to HHC or Outpatient PT/ OT if recommended. TCC to follow.     1156 PT did not identify any home going PT needs on discharge. Patient prefers to return home with no needs upon discharge.

## 2025-01-22 NOTE — PROGRESS NOTES
Social work consult placed for discharge planning. SW reviewed pt's chart and communicated with TCC. No SW needs foreseen at this time. SW signing off; available upon request.    YOLA Lind ([id number])   Care Transitions

## 2025-01-22 NOTE — PROGRESS NOTES
Occupational Therapy                 Therapy Communication Note    Patient Name: Naheed Hanley  MRN: [id number]  Department: [facility]  Room: [address] [place], [place] [address]  Today's Date: 1/22/2025     Discipline: Occupational Therapy      OT Screen/DC    Missed Visit Reason: Missed Visit Reason:  (OT Screen/DC)      Comment: Order received and chart review completed; Nursing AMPAC of 24. Pt independently ambulating to bathroom without difficulty. No skilled OT services indicated during hospital stay. Will discharge OT orders.

## 2025-01-22 NOTE — CARE PLAN
Problem: Pain - Adult  Goal: Verbalizes/displays adequate comfort level or baseline comfort level  1/22/2025 0213 by Darline Alexis RN  Outcome: Progressing  1/22/2025 0212 by Darline Alexis RN  Flowsheets (Taken 1/22/2025 0212)  Verbalizes/displays adequate comfort level or baseline comfort level:   Administer analgesics based on type and severity of pain and evaluate response   Encourage patient to monitor pain and request assistance   Assess pain using appropriate pain scale   Implement non-pharmacological measures as appropriate and evaluate response     Problem: Safety - Adult  Goal: Free from fall injury  Outcome: Progressing     Problem: Discharge Planning  Goal: Discharge to home or other facility with appropriate resources  Outcome: Progressing     Problem: Chronic Conditions and Co-morbidities  Goal: Patient's chronic conditions and co-morbidity symptoms are monitored and maintained or improved  Outcome: Progressing     Problem: Pain  Goal: Takes deep breaths with improved pain control throughout the shift  Outcome: Progressing  Goal: Turns in bed with improved pain control throughout the shift  Outcome: Progressing  Goal: Walks with improved pain control throughout the shift  Outcome: Progressing  Goal: Performs ADL's with improved pain control throughout shift  Outcome: Progressing

## 2025-02-14 LAB
ATRIAL RATE: 60 BPM
P AXIS: 32 DEGREES
PR INTERVAL: 160 MS
Q ONSET: 253 MS
QRS COUNT: 10 BEATS
QRS DURATION: 90 MS
QT INTERVAL: 401 MS
QTC CALCULATION(BAZETT): 401 MS
QTC FREDERICIA: 401 MS
R AXIS: 2 DEGREES
T AXIS: 68 DEGREES
T OFFSET: 453 MS
VENTRICULAR RATE: 60 BPM

## 2025-07-15 PROBLEM — Z01.810 PREOPERATIVE CARDIOVASCULAR EXAMINATION: Status: ACTIVE | Noted: 2025-07-15

## 2025-07-16 ENCOUNTER — OFFICE VISIT (OUTPATIENT)
Dept: CARDIOLOGY | Facility: CLINIC | Age: 70
End: 2025-07-16
Payer: MEDICARE

## 2025-07-16 VITALS
WEIGHT: 208 LBS | HEIGHT: 66 IN | HEART RATE: 71 BPM | SYSTOLIC BLOOD PRESSURE: 131 MMHG | DIASTOLIC BLOOD PRESSURE: 73 MMHG | TEMPERATURE: 98.2 F | BODY MASS INDEX: 33.43 KG/M2

## 2025-07-16 DIAGNOSIS — R42 DIZZINESS AND GIDDINESS: ICD-10-CM

## 2025-07-16 DIAGNOSIS — Z01.810 PREOPERATIVE CARDIOVASCULAR EXAMINATION: ICD-10-CM

## 2025-07-16 DIAGNOSIS — E78.2 MIXED HYPERLIPIDEMIA: ICD-10-CM

## 2025-07-16 DIAGNOSIS — I10 ESSENTIAL (PRIMARY) HYPERTENSION: Primary | ICD-10-CM

## 2025-07-16 LAB
ATRIAL RATE: 63 BPM
P AXIS: 54 DEGREES
P OFFSET: 189 MS
P ONSET: 139 MS
PR INTERVAL: 154 MS
Q ONSET: 216 MS
QRS COUNT: 11 BEATS
QRS DURATION: 90 MS
QT INTERVAL: 406 MS
QTC CALCULATION(BAZETT): 415 MS
QTC FREDERICIA: 412 MS
R AXIS: -32 DEGREES
T AXIS: 58 DEGREES
T OFFSET: 419 MS
VENTRICULAR RATE: 63 BPM

## 2025-07-16 PROCEDURE — 1160F RVW MEDS BY RX/DR IN RCRD: CPT | Performed by: INTERNAL MEDICINE

## 2025-07-16 PROCEDURE — 93005 ELECTROCARDIOGRAM TRACING: CPT | Performed by: INTERNAL MEDICINE

## 2025-07-16 PROCEDURE — 99204 OFFICE O/P NEW MOD 45 MIN: CPT | Performed by: INTERNAL MEDICINE

## 2025-07-16 PROCEDURE — 3078F DIAST BP <80 MM HG: CPT | Performed by: INTERNAL MEDICINE

## 2025-07-16 PROCEDURE — 99202 OFFICE O/P NEW SF 15 MIN: CPT

## 2025-07-16 PROCEDURE — 3075F SYST BP GE 130 - 139MM HG: CPT | Performed by: INTERNAL MEDICINE

## 2025-07-16 PROCEDURE — 3008F BODY MASS INDEX DOCD: CPT | Performed by: INTERNAL MEDICINE

## 2025-07-16 PROCEDURE — 1159F MED LIST DOCD IN RCRD: CPT | Performed by: INTERNAL MEDICINE

## 2025-07-16 RX ORDER — AMLODIPINE BESYLATE 5 MG/1
TABLET ORAL DAILY
COMMUNITY

## 2025-07-16 RX ORDER — ATORVASTATIN CALCIUM 20 MG/1
20 TABLET, FILM COATED ORAL DAILY
COMMUNITY

## 2025-07-16 RX ORDER — PANTOPRAZOLE SODIUM 40 MG/1
40 TABLET, DELAYED RELEASE ORAL
COMMUNITY

## 2025-07-16 RX ORDER — CALCIUM CARBONATE 300MG(750)
400 TABLET,CHEWABLE ORAL DAILY
COMMUNITY

## 2025-07-16 RX ORDER — CANDESARTAN 4 MG/1
4 TABLET ORAL DAILY
COMMUNITY

## 2025-07-16 NOTE — PROGRESS NOTES
Chief Complaint:   Pre-op Clearance     History of Present Illness     Naheed Hanley is a 70 y.o. female I had the pleasure of evaluating in cardiology consultation with pre-operative cardiac evaluation before planned TKA.  The patient has no prior history of coronary artery disease, myocardial infarction, PCI (stent), CABG, heart failure, high-grade arrhythmias, valvular heart disease, pulmonary hypertension, peripheral arterial disease, or cerebrovascular disease.  The patient has no personal or family history of anesthetic complications during prior general anesthesia.  The patient has no history of DVT or PE.  Based upon the patient's present history, while ambulation is limited due to joint pain, their functional capacity is greater than 4 METS.  Walked on 7/4/25 about 2 miles. Patients’ ability to expend >=4 METs can be assessed by estimates from activities of daily living; activities that expend >=4 METS include the ability to climb up a flight of stairs, walk up a hill, walk at ground level at 4 miles per hour, or perform heavy work around the house.  The patient denies chest pain or dyspnea on exertion.  The patient has a history of obstructive sleep apnea on CPAP,  and no alcohol use.  Uses vapes.  Pre-op laboratory studies were remarkable for CKD.  No Hx DM.  Well controlled HTN and HPL.  Had an episode of vertigo x 30 seconds (like she was moving) when she raised hr head up 1 month ago.  No Hx of syncope.    Previous History     Past Medical History:  She has a past medical history of Preoperative cardiovascular examination (07/15/2025).    Past Surgical History:  She has no past surgical history on file.      Social History:  She reports that she quit smoking about 2 years ago. Her smoking use included cigarettes. She has never used smokeless tobacco. No history on file for alcohol use and drug use.    Family History:  Family History[1]     Allergies:  Lisinopril, Penicillin, and  "Penicillins    Outpatient Medications:  Outpatient Medications:  Current Outpatient Medications   Medication Instructions    amLODIPine (Norvasc) 5 mg tablet Daily    atorvastatin (LIPITOR) 20 mg, Daily    candesartan (ATACAND) 4 mg, Daily    loratadine (CLARITIN) 10 mg, Daily    magnesium oxide (MAG-OX) 400 mg, Daily    pantoprazole (PROTONIX) 40 mg, Daily before breakfast    Synthroid 112 mcg tablet 1 tablet, Daily         Physical Examination   Vitals:  Visit Vitals  /73 (BP Location: Right arm)   Pulse 71   Temp 36.8 °C (98.2 °F)   Ht 1.676 m (5' 6\")   Wt 94.3 kg (208 lb)   BMI 33.57 kg/m²   Smoking Status Former   BSA 2.1 m²    Physical Exam  Vitals reviewed.   Constitutional:       General: She is not in acute distress.     Appearance: Normal appearance.   HENT:      Head: Normocephalic and atraumatic.      Nose: Nose normal.     Eyes:      Conjunctiva/sclera: Conjunctivae normal.       Cardiovascular:      Rate and Rhythm: Normal rate and regular rhythm.      Pulses: Normal pulses.      Heart sounds: No murmur heard.  Pulmonary:      Effort: Pulmonary effort is normal. No respiratory distress.      Breath sounds: Normal breath sounds. No wheezing, rhonchi or rales.   Abdominal:      General: Bowel sounds are normal. There is no distension.      Palpations: Abdomen is soft.      Tenderness: There is no abdominal tenderness.     Musculoskeletal:         General: No swelling.      Right lower leg: No edema.      Left lower leg: No edema.     Skin:     General: Skin is warm and dry.      Capillary Refill: Capillary refill takes less than 2 seconds.     Neurological:      General: No focal deficit present.      Mental Status: She is alert.     Psychiatric:         Mood and Affect: Mood normal.             Labs/Imaging/Cardiac Studies   I have personally reviewed the patient's available lab work, primary care appointment notes, pertinent imaging studies, and cardiac studies and have discussed them and my " independent interpretation of those results with the patient and caregiver at this appointment.  All pertinent recent Emergency Department evaluations and Hospital admissions were also reviewed in detail with the patient and caregiver.    Reviewed ECG and Labs    Echo:  No echocardiogram results found for the past 12 months       Assessment and Recommendations     Assessment/Plan       1. Dizziness and giddiness  Single episode of recurrent vertigo - brief.    - ECG 12 lead (Clinic Performed)    2. Essential (primary) hypertension (Primary)  The patient's blood pressure has been well-controlled at today's appointment or by recent primary care provider's measurements/home measurements and meets their goal blood pressure per the ACC/AHA guidelines.  The patient has been compliant with their anti-hypertensive medications and is following a low sodium/DASH diet. I advised continuation of their present medical treatment and lifestyle modification.      3. Mixed hyperlipidemia  The patient's lipids are well controlled on chronic atorvastatin therapy and they are meeting their goal LDL cholesterol per the ACC/AHA guidelines.      4. Preoperative cardiovascular examination  Based upon the above evaluation and The 2014 American College of Cardiology/American Heart Association (ACC/AHA) Guideline on Perioperative Cardiovascular Evaluation and Management for noncardiac surgery, and calculation of the Revised Cardiac Risk Index (RCRI) score=0, the patient is at low risk for an adverse cardiovascular event (ie, myocardial ischemia, myocardial infarction [MI], heart failure, arrhythmia, stroke, or cardiac death) for planned intermediate risk surgery.  No further cardiac testing is advised.             Landry Carey MD    Exclusive of any other services or procedures performed, I, Landry Carey MD , spent 30 minutes in duration for this visit today.  This time consisted of chart review, obtaining history, and/or performing the  exam as documented above as well as documenting the clinical information for the encounter in the electronic record, discussing treatment options, plans, and/or goals with patient, family, and/or caregiver, refilling medications, updating the electronic record, ordering medicines, lab work, imaging, referrals, and/or procedures as documented above and communicating with other [facility] professionals. I have discussed the results of laboratory, radiology, and cardiology studies with the patient and their family/caregiver.           [1] No family history on file.     STG (3-5 sessions) sit to stand/ stand to sit independent

## 2025-08-06 ENCOUNTER — HOSPITAL ENCOUNTER (EMERGENCY)
Facility: HOSPITAL | Age: 70
Discharge: HOME | End: 2025-08-06
Payer: MEDICARE

## 2025-08-06 ENCOUNTER — APPOINTMENT (OUTPATIENT)
Dept: RADIOLOGY | Facility: HOSPITAL | Age: 70
End: 2025-08-06
Payer: MEDICARE

## 2025-08-06 VITALS
SYSTOLIC BLOOD PRESSURE: 151 MMHG | HEIGHT: 67 IN | TEMPERATURE: 98.7 F | WEIGHT: 203 LBS | RESPIRATION RATE: 18 BRPM | OXYGEN SATURATION: 99 % | HEART RATE: 60 BPM | DIASTOLIC BLOOD PRESSURE: 70 MMHG | BODY MASS INDEX: 31.86 KG/M2

## 2025-08-06 DIAGNOSIS — K59.03 DRUG-INDUCED CONSTIPATION: Primary | ICD-10-CM

## 2025-08-06 LAB
ALBUMIN SERPL BCP-MCNC: 4.1 G/DL (ref 3.4–5)
ALP SERPL-CCNC: 81 U/L (ref 33–136)
ALT SERPL W P-5'-P-CCNC: 12 U/L (ref 7–45)
ANION GAP SERPL CALC-SCNC: 10 MMOL/L (ref 10–20)
APPEARANCE UR: CLEAR
AST SERPL W P-5'-P-CCNC: 14 U/L (ref 9–39)
BASOPHILS # BLD AUTO: 0.04 X10*3/UL (ref 0–0.1)
BASOPHILS NFR BLD AUTO: 0.4 %
BILIRUB SERPL-MCNC: 0.6 MG/DL (ref 0–1.2)
BILIRUB UR STRIP.AUTO-MCNC: NEGATIVE MG/DL
BUN SERPL-MCNC: 20 MG/DL (ref 6–23)
CALCIUM SERPL-MCNC: 10 MG/DL (ref 8.6–10.3)
CHLORIDE SERPL-SCNC: 106 MMOL/L (ref 98–107)
CO2 SERPL-SCNC: 27 MMOL/L (ref 21–32)
COLOR UR: YELLOW
CREAT SERPL-MCNC: 1.77 MG/DL (ref 0.5–1.05)
EGFRCR SERPLBLD CKD-EPI 2021: 30 ML/MIN/1.73M*2
EOSINOPHIL # BLD AUTO: 0.35 X10*3/UL (ref 0–0.4)
EOSINOPHIL NFR BLD AUTO: 3.9 %
ERYTHROCYTE [DISTWIDTH] IN BLOOD BY AUTOMATED COUNT: 14.1 % (ref 11.5–14.5)
GLUCOSE SERPL-MCNC: 94 MG/DL (ref 74–99)
GLUCOSE UR STRIP.AUTO-MCNC: NORMAL MG/DL
HCT VFR BLD AUTO: 37.1 % (ref 36–46)
HGB BLD-MCNC: 12.2 G/DL (ref 12–16)
HYALINE CASTS #/AREA URNS AUTO: ABNORMAL /LPF
IMM GRANULOCYTES # BLD AUTO: 0.02 X10*3/UL (ref 0–0.5)
IMM GRANULOCYTES NFR BLD AUTO: 0.2 % (ref 0–0.9)
KETONES UR STRIP.AUTO-MCNC: NEGATIVE MG/DL
LACTATE SERPL-SCNC: 1.2 MMOL/L (ref 0.4–2)
LEUKOCYTE ESTERASE UR QL STRIP.AUTO: ABNORMAL
LYMPHOCYTES # BLD AUTO: 2.05 X10*3/UL (ref 0.8–3)
LYMPHOCYTES NFR BLD AUTO: 22.7 %
MCH RBC QN AUTO: 30.6 PG (ref 26–34)
MCHC RBC AUTO-ENTMCNC: 32.9 G/DL (ref 32–36)
MCV RBC AUTO: 93 FL (ref 80–100)
MONOCYTES # BLD AUTO: 0.88 X10*3/UL (ref 0.05–0.8)
MONOCYTES NFR BLD AUTO: 9.8 %
MUCOUS THREADS #/AREA URNS AUTO: ABNORMAL /LPF
NEUTROPHILS # BLD AUTO: 5.68 X10*3/UL (ref 1.6–5.5)
NEUTROPHILS NFR BLD AUTO: 63 %
NITRITE UR QL STRIP.AUTO: NEGATIVE
NRBC BLD-RTO: 0 /100 WBCS (ref 0–0)
PH UR STRIP.AUTO: 5.5 [PH]
PLATELET # BLD AUTO: 370 X10*3/UL (ref 150–450)
POTASSIUM SERPL-SCNC: 4.3 MMOL/L (ref 3.5–5.3)
PROT SERPL-MCNC: 7.6 G/DL (ref 6.4–8.2)
PROT UR STRIP.AUTO-MCNC: ABNORMAL MG/DL
RBC # BLD AUTO: 3.99 X10*6/UL (ref 4–5.2)
RBC # UR STRIP.AUTO: NEGATIVE MG/DL
RBC #/AREA URNS AUTO: ABNORMAL /HPF
SODIUM SERPL-SCNC: 139 MMOL/L (ref 136–145)
SP GR UR STRIP.AUTO: 1.03
SQUAMOUS #/AREA URNS AUTO: ABNORMAL /HPF
UROBILINOGEN UR STRIP.AUTO-MCNC: ABNORMAL MG/DL
WBC # BLD AUTO: 9 X10*3/UL (ref 4.4–11.3)
WBC #/AREA URNS AUTO: ABNORMAL /HPF

## 2025-08-06 PROCEDURE — 2500000004 HC RX 250 GENERAL PHARMACY W/ HCPCS (ALT 636 FOR OP/ED): Performed by: NURSE PRACTITIONER

## 2025-08-06 PROCEDURE — 83605 ASSAY OF LACTIC ACID: CPT | Performed by: NURSE PRACTITIONER

## 2025-08-06 PROCEDURE — 74176 CT ABD & PELVIS W/O CONTRAST: CPT

## 2025-08-06 PROCEDURE — 96375 TX/PRO/DX INJ NEW DRUG ADDON: CPT

## 2025-08-06 PROCEDURE — 85025 COMPLETE CBC W/AUTO DIFF WBC: CPT | Performed by: NURSE PRACTITIONER

## 2025-08-06 PROCEDURE — 87086 URINE CULTURE/COLONY COUNT: CPT | Mod: PORLAB | Performed by: NURSE PRACTITIONER

## 2025-08-06 PROCEDURE — 99284 EMERGENCY DEPT VISIT MOD MDM: CPT | Mod: 25

## 2025-08-06 PROCEDURE — 81001 URINALYSIS AUTO W/SCOPE: CPT | Performed by: NURSE PRACTITIONER

## 2025-08-06 PROCEDURE — 74176 CT ABD & PELVIS W/O CONTRAST: CPT | Performed by: STUDENT IN AN ORGANIZED HEALTH CARE EDUCATION/TRAINING PROGRAM

## 2025-08-06 PROCEDURE — 96361 HYDRATE IV INFUSION ADD-ON: CPT

## 2025-08-06 PROCEDURE — 2500000005 HC RX 250 GENERAL PHARMACY W/O HCPCS: Performed by: NURSE PRACTITIONER

## 2025-08-06 PROCEDURE — 96374 THER/PROPH/DIAG INJ IV PUSH: CPT

## 2025-08-06 PROCEDURE — 80053 COMPREHEN METABOLIC PANEL: CPT | Performed by: NURSE PRACTITIONER

## 2025-08-06 PROCEDURE — 36415 COLL VENOUS BLD VENIPUNCTURE: CPT | Performed by: NURSE PRACTITIONER

## 2025-08-06 RX ORDER — POLYETHYLENE GLYCOL 3350 17 G/17G
17 POWDER, FOR SOLUTION ORAL DAILY
Qty: 510 G | Refills: 0 | Status: SHIPPED | OUTPATIENT
Start: 2025-08-06 | End: 2025-09-05

## 2025-08-06 RX ORDER — KETOROLAC TROMETHAMINE 30 MG/ML
15 INJECTION, SOLUTION INTRAMUSCULAR; INTRAVENOUS ONCE
Status: COMPLETED | OUTPATIENT
Start: 2025-08-06 | End: 2025-08-06

## 2025-08-06 RX ORDER — AMOXICILLIN 250 MG
1 CAPSULE ORAL DAILY
Qty: 5 TABLET | Refills: 0 | Status: SHIPPED | OUTPATIENT
Start: 2025-08-06 | End: 2025-08-11

## 2025-08-06 RX ORDER — LIDOCAINE HYDROCHLORIDE 20 MG/ML
1 JELLY TOPICAL ONCE
Status: COMPLETED | OUTPATIENT
Start: 2025-08-06 | End: 2025-08-06

## 2025-08-06 RX ORDER — ONDANSETRON HYDROCHLORIDE 2 MG/ML
4 INJECTION, SOLUTION INTRAVENOUS ONCE
Status: COMPLETED | OUTPATIENT
Start: 2025-08-06 | End: 2025-08-06

## 2025-08-06 RX ADMIN — ONDANSETRON 4 MG: 2 INJECTION, SOLUTION INTRAMUSCULAR; INTRAVENOUS at 20:35

## 2025-08-06 RX ADMIN — LIDOCAINE HYDROCHLORIDE 1 APPLICATION: 20 JELLY TOPICAL at 20:40

## 2025-08-06 RX ADMIN — KETOROLAC TROMETHAMINE 15 MG: 30 INJECTION, SOLUTION INTRAMUSCULAR at 20:34

## 2025-08-06 RX ADMIN — SODIUM CHLORIDE 500 ML: 0.9 INJECTION, SOLUTION INTRAVENOUS at 20:41

## 2025-08-06 ASSESSMENT — PAIN DESCRIPTION - ORIENTATION: ORIENTATION: RIGHT

## 2025-08-06 ASSESSMENT — PAIN DESCRIPTION - LOCATION
LOCATION: KNEE
LOCATION: KNEE

## 2025-08-06 ASSESSMENT — PAIN DESCRIPTION - FREQUENCY: FREQUENCY: CONSTANT/CONTINUOUS

## 2025-08-06 ASSESSMENT — PAIN DESCRIPTION - PAIN TYPE: TYPE: SURGICAL PAIN

## 2025-08-06 ASSESSMENT — PAIN DESCRIPTION - DESCRIPTORS: DESCRIPTORS: ACHING

## 2025-08-06 ASSESSMENT — VISUAL ACUITY: OU: 1

## 2025-08-06 ASSESSMENT — PAIN SCALES - GENERAL
PAINLEVEL_OUTOF10: 5 - MODERATE PAIN
PAINLEVEL_OUTOF10: 2

## 2025-08-06 ASSESSMENT — PAIN - FUNCTIONAL ASSESSMENT
PAIN_FUNCTIONAL_ASSESSMENT: 0-10
PAIN_FUNCTIONAL_ASSESSMENT: 0-10

## 2025-08-07 LAB
BACTERIA UR CULT: NORMAL
HOLD SPECIMEN: NORMAL

## 2025-08-07 NOTE — ED PROVIDER NOTES
"    HPI   Chief Complaint   Patient presents with    Constipation     Last BM 27th.  Recent knee surgery.         Patient presents the emergency department for evaluation of constipation after surgical procedure.  Patient had knee arthroplasty with Dr. Anderson at the end of July.  Since then she has had persistent constipation secondary to OxyContin use.  She has taken Dulcolax, Senokot and suppositories with no resolve of constipation.  She has passed gas and has not had any vomiting.  She does have a history of small bowel obstruction in the past in which she was, \"vomiting stool.\"  She also has a history of polyps on colonoscopy with no known history of diverticulitis.  Otherwise her only surgical history of the abdomen is a hysterectomy.  She has no associated fever, chills, myalgias, malaise, chest pain, palpitations, shortness of breath, cough, UTI symptoms or change in her incision site as she reports healing well without redness, drainage or increased pain at the leg.      History provided by:  Patient   used: No                          Brigido Coma Scale Score: 15                  Patient History   Medical History[1]  Surgical History[2]  Family History[3]  Social History[4]    Physical Exam   Visit Vitals  /69   Pulse 76   Temp 37.1 °C (98.7 °F) (Temporal)   Resp 18   Ht 1.702 m (5' 7\")   Wt 92.1 kg (203 lb)   SpO2 98%   BMI 31.79 kg/m²   Smoking Status Former   BSA 2.09 m²      Physical Exam  Vitals reviewed.   Constitutional:       General: She is not in acute distress.     Appearance: She is not toxic-appearing.   HENT:      Head: Normocephalic and atraumatic.      Right Ear: Hearing normal.      Left Ear: Hearing normal.      Nose: Nose normal.      Mouth/Throat:      Lips: Pink.      Mouth: Mucous membranes are moist.      Pharynx: Oropharynx is clear.     Eyes:      General: Vision grossly intact. No scleral icterus.      Cardiovascular:      Rate and Rhythm: Normal rate and " regular rhythm.      Pulses:           Radial pulses are 2+ on the left side.      Comments: No resting tachycardia.  Pulmonary:      Effort: Pulmonary effort is normal.      Breath sounds: Normal breath sounds.   Abdominal:      General: Bowel sounds are normal.      Palpations: Abdomen is soft. There is no pulsatile mass.      Tenderness: There is no abdominal tenderness. There is no right CVA tenderness or left CVA tenderness.   Genitourinary:     Comments: Pelvic exam deferred.  Patient consents to rectal exam with Ester ADKINS present for consent and as a chaperone.  There are external hemorrhoids without any gross blood or tenderness.  Digital exam reflects no obvious masses.  There is hard small balls of stool that are high up in the vault.  Unable to hook or pull any stool out.  There is tan stool on gloved finger with no gross blood.  Patient tolerated exam well.    Musculoskeletal:      Cervical back: Normal range of motion and neck supple.      Comments: Postoperative knee has Steri-Strips in place with no dehiscence, erythema or drainage.  No posterior leg pain or palpable cords suggestive of DVT.  Neurovascularly intact.  Otherwise moves all extremities randomly and is able to transfer from wheelchair to ED cart well and independently     Skin:     General: Skin is warm and dry.      Capillary Refill: Capillary refill takes less than 2 seconds.      Coloration: Skin is not cyanotic, jaundiced or pale.     Neurological:      Mental Status: She is alert and oriented to person, place, and time.      Sensory: Sensation is intact.      Motor: Motor function is intact.      Coordination: Coordination is intact.      Comments: Patient able to bear weight well with her postop leg.   Psychiatric:         Mood and Affect: Mood and affect normal.         Behavior: Behavior is cooperative.         Thought Content: Thought content normal.         CT abdomen pelvis wo IV contrast   Final Result   1.  No CT evidence of  acute abnormality in the abdomen or pelvis.   2. No bowel obstruction.   3. Mild-to-moderate colonic stool burden is nonspecific.             MACRO:   None        Signed by: Kaleb Kapadia 8/6/2025 9:53 PM   Dictation workstation:   [id number]          Labs Reviewed   CBC WITH AUTO DIFFERENTIAL - Abnormal       Result Value    WBC 9.0      nRBC 0.0      RBC 3.99 (*)     Hemoglobin 12.2      Hematocrit 37.1      MCV 93      MCH 30.6      MCHC 32.9      RDW 14.1      Platelets 370      Neutrophils % 63.0      Immature Granulocytes %, Automated 0.2      Lymphocytes % 22.7      Monocytes % 9.8      Eosinophils % 3.9      Basophils % 0.4      Neutrophils Absolute 5.68 (*)     Immature Granulocytes Absolute, Automated 0.02      Lymphocytes Absolute 2.05      Monocytes Absolute 0.88 (*)     Eosinophils Absolute 0.35      Basophils Absolute 0.04     COMPREHENSIVE METABOLIC PANEL - Abnormal    Glucose 94      Sodium 139      Potassium 4.3      Chloride 106      Bicarbonate 27      Anion Gap 10      Urea Nitrogen 20      Creatinine 1.77 (*)     eGFR 30 (*)     Calcium 10.0      Albumin 4.1      Alkaline Phosphatase 81      Total Protein 7.6      AST 14      Bilirubin, Total 0.6      ALT 12     URINALYSIS WITH REFLEX CULTURE AND MICROSCOPIC - Abnormal    Color, Urine Yellow      Appearance, Urine Clear      Specific Gravity, Urine 1.033      pH, Urine 5.5      Protein, Urine 10 (TRACE)      Glucose, Urine Normal      Blood, Urine NEGATIVE      Ketones, Urine NEGATIVE      Bilirubin, Urine NEGATIVE      Urobilinogen, Urine 2 (1+) (*)     Nitrite, Urine NEGATIVE      Leukocyte Esterase, Urine 75 Tom/uL (*)    MICROSCOPIC ONLY, URINE - Abnormal    WBC, Urine 11-20 (*)     RBC, Urine 1-2      Squamous Epithelial Cells, Urine 1-9 (SPARSE)      Mucus, Urine 1+      Hyaline Casts, Urine OCCASIONAL (*)    LACTATE - Normal    Lactate 1.2      Narrative:     Venipuncture immediately after or during the administration of Metamizole may  lead to falsely low results. Testing should be performed immediately prior to Metamizole dosing.   URINE CULTURE   URINALYSIS WITH REFLEX CULTURE AND MICROSCOPIC    Narrative:     The following orders were created for panel order Urinalysis with Reflex Culture and Microscopic.  Procedure                               Abnormality         Status                     ---------                               -----------         ------                     Urinalysis with Reflex C...[[id number]]  Abnormal            Final result               Extra Urine Gray Tube[[id number]]                            In process                   Please view results for these tests on the individual orders.   EXTRA URINE GRAY TUBE       Encounter Date: 07/16/25   ECG 12 lead (Clinic Performed)   Result Value    Ventricular Rate 63    Atrial Rate 63    PA Interval 154    QRS Duration 90    QT Interval 406    QTC Calculation(Bazett) 415    P Axis 54    R Axis -32    T Axis 58    QRS Count 11    Q Onset 216    P Onset 139    P Offset 189    T Offset 419    QTC Fredericia 412    Narrative    Normal sinus rhythm  Left axis deviation  Abnormal ECG  When compared with ECG of 21-JAN-2025 16:15,  PREVIOUS ECG IS PRESENT       ED Course & MDM     Medical Decision Making  Patient presents the emergency department for evaluation of constipation postoperatively.  Differential diagnosis of opiate induced constipation, small bowel obstruction, postop ileus and diverticulitis to mention a few.  Plan is for baseline labs, CT imaging and rectal exam.  Patient provides consent.  Patient was initially given a dose of Toradol and Zofran to help avoid use of opiate medication for her symptoms unbeknownst to her degree of renal dysfunction.  CT imaging was changed to without IV contrast due to her GFR of 30 and patient provides consent.        ED Course as of 08/06/25 2336   Wed Aug 06, 2025   2112 WBC, Urine(!): 11-20 [NA]   2113 EGFR(!): 30  CT changed to  without IV contrast [NA]   2113 Rectal exam and disimpaction completed with Ester ADKINS present for consent and for exam.  No gross blood.  Unable to hook any large amount of stool as it is higher up in the vault.  External hemorrhoids present without gross blood. [NA]   2236 Patient updated on results including CT which does not show any ileus, bowel obstruction or diverticulitis.  With a normal white blood cell count and lactate, and with no continued abdominal pain, plan is for soapsuds enema for potential discharge home. [NA]   2333 Patient did have response to soapsuds enema with bowel movement.  She is verbalizing readiness for discharge home.  Abdomen remains soft nontender. Plan is for MiraLAX daily, Caitlyn-Colace as needed and outpatient follow-up with primary care and orthopedics as scheduled. Patient is non-toxic, not hypoxic and appropriate for this outpatient management plan which they prefer. Encouragement to arrange close follow up was discussed as well as provided in a written handout of discharge instructions. Patient was educated on signs of symptoms to watch for indicative of re-evaluation in the emergency department setting to include any worsening of current symptoms. They verbalized understanding of instructions and is amenable to this treatment plan with no social determinants of health that would obscure this plan. Patient departed in stable condition.  [NA]      ED Course User Index  [NA] KATY Lucero-CNP         Diagnoses as of 08/06/25 2336   Drug-induced constipation          Your medication list        START taking these medications        Instructions Last Dose Given Next Dose Due   polyethylene glycol 17 gram/dose powder  Commonly known as: Glycolax, Miralax      Mix 17 g of powder and drink once daily.       sennosides-docusate sodium 8.6-50 mg tablet  Commonly known as: Caitlyn-Colace      Take 1 tablet by mouth once daily for 5 days.              ASK your doctor about these  medications        Instructions Last Dose Given Next Dose Due   amLODIPine 5 mg tablet  Commonly known as: Norvasc           atorvastatin 20 mg tablet  Commonly known as: Lipitor           candesartan 4 mg tablet  Commonly known as: Atacand           loratadine 10 mg tablet  Commonly known as: Claritin           magnesium oxide 400 mg tablet  Commonly known as: Mag-Ox           pantoprazole 40 mg EC tablet  Commonly known as: ProtoNix           Synthroid 112 mcg tablet  Generic drug: levothyroxine                     Where to Get Your Medications        These medications were sent to [id number] - [facility] [address] [address]  [address] [address]      Phone: [phone]   polyethylene glycol 17 gram/dose powder  sennosides-docusate sodium 8.6-50 mg tablet         Procedure  None     *This report was transcribed using voice recognition software.  Every effort was made to ensure accuracy; however, inadvertent computerized transcription errors may be present.*  GURPREET Lucero  25           [1]   Past Medical History:  Diagnosis Date    Preoperative cardiovascular examination 07/15/2025   [2]   Past Surgical History:  Procedure Laterality Date    TOTAL KNEE ARTHROPLASTY Right    [3] No family history on file.  [4]   Social History  Tobacco Use    Smoking status: Former     Current packs/day: 0.00     Types: Cigarettes     Quit date:      Years since quittin.5    Smokeless tobacco: Never   Vaping Use    Vaping status: Every Day   Substance Use Topics    Alcohol use: Not on file    Drug use: Not on file        GURPREET Lucero  25 [id number]

## 2025-08-19 ENCOUNTER — EVALUATION (OUTPATIENT)
Dept: PHYSICAL THERAPY | Facility: CLINIC | Age: 70
End: 2025-08-19
Payer: MEDICARE

## 2025-08-19 DIAGNOSIS — M17.11 PRIMARY LOCALIZED OSTEOARTHRITIS OF RIGHT KNEE: Primary | ICD-10-CM

## 2025-08-19 PROCEDURE — 97161 PT EVAL LOW COMPLEX 20 MIN: CPT | Mod: GP

## 2025-08-19 PROCEDURE — 97110 THERAPEUTIC EXERCISES: CPT | Mod: GP

## 2025-08-22 ENCOUNTER — TREATMENT (OUTPATIENT)
Dept: PHYSICAL THERAPY | Facility: CLINIC | Age: 70
End: 2025-08-22
Payer: MEDICARE

## 2025-08-22 DIAGNOSIS — M17.11 PRIMARY LOCALIZED OSTEOARTHRITIS OF RIGHT KNEE: Primary | ICD-10-CM

## 2025-08-22 PROCEDURE — 97110 THERAPEUTIC EXERCISES: CPT | Mod: GP

## 2025-08-22 PROCEDURE — 97140 MANUAL THERAPY 1/> REGIONS: CPT | Mod: GP

## 2025-08-25 ENCOUNTER — TREATMENT (OUTPATIENT)
Dept: PHYSICAL THERAPY | Facility: CLINIC | Age: 70
End: 2025-08-25
Payer: MEDICARE

## 2025-08-25 DIAGNOSIS — M17.11 PRIMARY LOCALIZED OSTEOARTHRITIS OF RIGHT KNEE: Primary | ICD-10-CM

## 2025-08-25 PROCEDURE — 97110 THERAPEUTIC EXERCISES: CPT | Mod: GP

## 2025-08-27 ENCOUNTER — TREATMENT (OUTPATIENT)
Dept: PHYSICAL THERAPY | Facility: CLINIC | Age: 70
End: 2025-08-27
Payer: MEDICARE

## 2025-08-27 DIAGNOSIS — M17.11 PRIMARY LOCALIZED OSTEOARTHRITIS OF RIGHT KNEE: Primary | ICD-10-CM

## 2025-08-27 PROCEDURE — 97110 THERAPEUTIC EXERCISES: CPT | Mod: GP,CQ

## 2025-09-02 ENCOUNTER — TREATMENT (OUTPATIENT)
Dept: PHYSICAL THERAPY | Facility: CLINIC | Age: 70
End: 2025-09-02
Payer: MEDICARE

## 2025-09-02 DIAGNOSIS — M17.11 PRIMARY LOCALIZED OSTEOARTHRITIS OF RIGHT KNEE: Primary | ICD-10-CM

## 2025-09-02 PROCEDURE — 97110 THERAPEUTIC EXERCISES: CPT | Mod: GP

## 2025-09-05 ENCOUNTER — TREATMENT (OUTPATIENT)
Dept: PHYSICAL THERAPY | Facility: CLINIC | Age: 70
End: 2025-09-05
Payer: MEDICARE

## 2025-09-05 DIAGNOSIS — M17.11 PRIMARY LOCALIZED OSTEOARTHRITIS OF RIGHT KNEE: Primary | ICD-10-CM

## 2025-09-05 PROCEDURE — 97140 MANUAL THERAPY 1/> REGIONS: CPT | Mod: GP

## 2025-09-05 PROCEDURE — 97110 THERAPEUTIC EXERCISES: CPT | Mod: GP
